# Patient Record
Sex: MALE | Race: WHITE | NOT HISPANIC OR LATINO | Employment: FULL TIME | URBAN - NONMETROPOLITAN AREA
[De-identification: names, ages, dates, MRNs, and addresses within clinical notes are randomized per-mention and may not be internally consistent; named-entity substitution may affect disease eponyms.]

---

## 2018-09-26 ENCOUNTER — HOSPITAL ENCOUNTER (EMERGENCY)
Facility: HOSPITAL | Age: 38
Discharge: HOME | End: 2018-09-26
Attending: EMERGENCY MEDICINE | Admitting: EMERGENCY MEDICINE

## 2018-09-26 VITALS
HEIGHT: 71 IN | WEIGHT: 220 LBS | DIASTOLIC BLOOD PRESSURE: 90 MMHG | OXYGEN SATURATION: 98 % | SYSTOLIC BLOOD PRESSURE: 141 MMHG | HEART RATE: 93 BPM | RESPIRATION RATE: 8 BRPM | TEMPERATURE: 98.6 F | BODY MASS INDEX: 30.8 KG/M2

## 2018-09-26 DIAGNOSIS — F10.10 ALCOHOL ABUSE: Primary | ICD-10-CM

## 2018-09-26 LAB
6-ACETYL MORPHINE: NEGATIVE
ALBUMIN SERPL-MCNC: 5 G/DL (ref 3.5–5)
ALBUMIN/GLOB SERPL: 1.8 G/DL (ref 1.5–2.5)
ALP SERPL-CCNC: 55 U/L (ref 40–129)
ALT SERPL W P-5'-P-CCNC: 68 U/L (ref 10–44)
AMPHET+METHAMPHET UR QL: NEGATIVE
ANION GAP SERPL CALCULATED.3IONS-SCNC: 7.7 MMOL/L (ref 3.6–11.2)
AST SERPL-CCNC: 51 U/L (ref 10–34)
BARBITURATES UR QL SCN: NEGATIVE
BASOPHILS # BLD AUTO: 0.01 10*3/MM3 (ref 0–0.3)
BASOPHILS NFR BLD AUTO: 0.1 % (ref 0–2)
BENZODIAZ UR QL SCN: NEGATIVE
BILIRUB SERPL-MCNC: 1.6 MG/DL (ref 0.2–1.8)
BUN BLD-MCNC: 7 MG/DL (ref 7–21)
BUN/CREAT SERPL: 8.2 (ref 7–25)
BUPRENORPHINE SERPL-MCNC: NEGATIVE NG/ML
CALCIUM SPEC-SCNC: 9.3 MG/DL (ref 7.7–10)
CANNABINOIDS SERPL QL: POSITIVE
CHLORIDE SERPL-SCNC: 103 MMOL/L (ref 99–112)
CO2 SERPL-SCNC: 26.3 MMOL/L (ref 24.3–31.9)
COCAINE UR QL: NEGATIVE
CREAT BLD-MCNC: 0.85 MG/DL (ref 0.43–1.29)
DEPRECATED RDW RBC AUTO: 45.4 FL (ref 37–54)
EOSINOPHIL # BLD AUTO: 0.22 10*3/MM3 (ref 0–0.7)
EOSINOPHIL NFR BLD AUTO: 2.9 % (ref 0–5)
ERYTHROCYTE [DISTWIDTH] IN BLOOD BY AUTOMATED COUNT: 13.1 % (ref 11.5–14.5)
ETHANOL BLD-MCNC: 40 MG/DL
ETHANOL UR QL: 0.04 %
GFR SERPL CREATININE-BSD FRML MDRD: 100 ML/MIN/1.73
GLOBULIN UR ELPH-MCNC: 2.8 GM/DL
GLUCOSE BLD-MCNC: 124 MG/DL (ref 70–110)
HCT VFR BLD AUTO: 48.4 % (ref 42–52)
HGB BLD-MCNC: 16.6 G/DL (ref 14–18)
IMM GRANULOCYTES # BLD: 0.03 10*3/MM3 (ref 0–0.03)
IMM GRANULOCYTES NFR BLD: 0.4 % (ref 0–0.5)
LYMPHOCYTES # BLD AUTO: 1.53 10*3/MM3 (ref 1–3)
LYMPHOCYTES NFR BLD AUTO: 19.9 % (ref 21–51)
MAGNESIUM SERPL-MCNC: 2 MG/DL (ref 1.7–2.6)
MCH RBC QN AUTO: 32.5 PG (ref 27–33)
MCHC RBC AUTO-ENTMCNC: 34.3 G/DL (ref 33–37)
MCV RBC AUTO: 94.7 FL (ref 80–94)
METHADONE UR QL SCN: NEGATIVE
MONOCYTES # BLD AUTO: 0.74 10*3/MM3 (ref 0.1–0.9)
MONOCYTES NFR BLD AUTO: 9.6 % (ref 0–10)
NEUTROPHILS # BLD AUTO: 5.16 10*3/MM3 (ref 1.4–6.5)
NEUTROPHILS NFR BLD AUTO: 67.1 % (ref 30–70)
OPIATES UR QL: NEGATIVE
OSMOLALITY SERPL CALC.SUM OF ELEC: 273.2 MOSM/KG (ref 273–305)
OXYCODONE UR QL SCN: NEGATIVE
PCP UR QL SCN: NEGATIVE
PLATELET # BLD AUTO: 201 10*3/MM3 (ref 130–400)
PMV BLD AUTO: 10.7 FL (ref 6–10)
POTASSIUM BLD-SCNC: 3.9 MMOL/L (ref 3.5–5.3)
PROT SERPL-MCNC: 7.8 G/DL (ref 6–8)
RBC # BLD AUTO: 5.11 10*6/MM3 (ref 4.7–6.1)
SODIUM BLD-SCNC: 137 MMOL/L (ref 135–153)
WBC NRBC COR # BLD: 7.69 10*3/MM3 (ref 4.5–12.5)

## 2018-09-26 PROCEDURE — 80307 DRUG TEST PRSMV CHEM ANLYZR: CPT | Performed by: EMERGENCY MEDICINE

## 2018-09-26 PROCEDURE — 85025 COMPLETE CBC W/AUTO DIFF WBC: CPT | Performed by: EMERGENCY MEDICINE

## 2018-09-26 PROCEDURE — 99284 EMERGENCY DEPT VISIT MOD MDM: CPT

## 2018-09-26 PROCEDURE — 83735 ASSAY OF MAGNESIUM: CPT | Performed by: EMERGENCY MEDICINE

## 2018-09-26 PROCEDURE — 80053 COMPREHEN METABOLIC PANEL: CPT | Performed by: EMERGENCY MEDICINE

## 2018-09-26 RX ORDER — SODIUM CHLORIDE 0.9 % (FLUSH) 0.9 %
1-10 SYRINGE (ML) INJECTION AS NEEDED
Status: DISCONTINUED | OUTPATIENT
Start: 2018-09-26 | End: 2018-09-26 | Stop reason: HOSPADM

## 2018-09-26 RX ORDER — LISINOPRIL 20 MG/1
20 TABLET ORAL DAILY
COMMUNITY
End: 2018-10-04

## 2018-09-26 NOTE — ED NOTES
Per JASMINE Flores PA-C, pt medically cleared for intake.  Notified, MELBA Smith Intake. Pt to be wheeled to intake area per DAYNE Benitez, Denia Junior RN  09/26/18 9555

## 2018-09-26 NOTE — ED PROVIDER NOTES
Subjective   Alcohol problem   Wants detox   Drank 2 beers pta         History provided by:  Patient   used: No    Drug / Alcohol Assessment   Primary symptoms include agitation. This is a recurrent problem. The current episode started 1 to 2 hours ago. The problem has not changed since onset.Suspected agents include alcohol. Pertinent negatives include no fever, no nausea and no vomiting. Associated medical issues include withdrawal syndrome.       Review of Systems   Constitutional: Negative for chills and fever.   HENT: Negative for congestion, ear pain and sore throat.    Respiratory: Negative for cough, shortness of breath and wheezing.    Cardiovascular: Negative for chest pain.   Gastrointestinal: Negative for diarrhea, nausea and vomiting.   Genitourinary: Negative for dysuria and flank pain.   Skin: Negative for rash.   Neurological: Negative for headaches.   Psychiatric/Behavioral: Positive for agitation. The patient is not nervous/anxious.    All other systems reviewed and are negative.      Past Medical History:   Diagnosis Date   • Diverticulitis    • Hypertension        No Known Allergies    Past Surgical History:   Procedure Laterality Date   • COLON RESECTION N/A 11/10/2016    Procedure: COLON RESECTION LAPAROSCOPIC ;  Surgeon: Miky Antoine MD;  Location: [facility] OR;  Service:    • COLONOSCOPY N/A 11/9/2016    Procedure: COLONOSCOPY;  Surgeon: Miky Antoine MD;  Location: [facility] ENDOSCOPY;  Service:    • FRACTURE SURGERY         Family History   Problem Relation Age of Onset   • Hypertension Brother    • Diabetes Maternal Grandmother        Social History     Social History   • Marital status:      Social History Main Topics   • Smoking status: Current Every Day Smoker     Packs/day: 1.50     Years: 26.00   • Smokeless tobacco: Current User     Types: Snuff   • Alcohol use 14.4 oz/week     24 Cans of beer per week      Comment: 12-14 beer daily   • Drug use: Yes      Types: Marijuana   • Sexual activity: No     Other Topics Concern   • Not on file           Objective   Physical Exam   Constitutional: He is oriented to person, place, and time. He appears well-developed and well-nourished.   HENT:   Head: Normocephalic.   Mouth/Throat: Oropharynx is clear and moist.   Neck: Neck supple.   Cardiovascular: Normal rate and regular rhythm.    Pulmonary/Chest: Effort normal and breath sounds normal.   Abdominal: Soft. Bowel sounds are normal. There is no tenderness.   Musculoskeletal: Normal range of motion.   Neurological: He is alert and oriented to person, place, and time.   Skin: Skin is warm and dry.   Psychiatric: He has a normal mood and affect. His behavior is normal. Judgment and thought content normal.   Nursing note and vitals reviewed.      Procedures           ED Course                  MDM      Final diagnoses:   Alcohol abuse            Mana Flores PA  09/26/18 1416

## 2018-10-04 ENCOUNTER — HOSPITAL ENCOUNTER (EMERGENCY)
Facility: HOSPITAL | Age: 38
Discharge: STILL ADMITTED OR ADMINISTRATIVE | End: 2018-10-04
Attending: EMERGENCY MEDICINE

## 2018-10-04 ENCOUNTER — HOSPITAL ENCOUNTER (INPATIENT)
Facility: HOSPITAL | Age: 38
LOS: 5 days | Discharge: HOME | End: 2018-10-09
Attending: PSYCHIATRY & NEUROLOGY | Admitting: PSYCHIATRY & NEUROLOGY

## 2018-10-04 VITALS
RESPIRATION RATE: 18 BRPM | HEIGHT: 71 IN | WEIGHT: 220 LBS | BODY MASS INDEX: 30.8 KG/M2 | OXYGEN SATURATION: 99 % | DIASTOLIC BLOOD PRESSURE: 78 MMHG | HEART RATE: 68 BPM | TEMPERATURE: 97.4 F | SYSTOLIC BLOOD PRESSURE: 129 MMHG

## 2018-10-04 DIAGNOSIS — F10.20 UNCOMPLICATED ALCOHOL DEPENDENCE (HCC): Primary | ICD-10-CM

## 2018-10-04 LAB
6-ACETYL MORPHINE: NEGATIVE
ALBUMIN SERPL-MCNC: 4.7 G/DL (ref 3.5–5)
ALBUMIN/GLOB SERPL: 1.8 G/DL (ref 1.5–2.5)
ALP SERPL-CCNC: 52 U/L (ref 40–129)
ALT SERPL W P-5'-P-CCNC: 74 U/L (ref 10–44)
AMPHET+METHAMPHET UR QL: NEGATIVE
ANION GAP SERPL CALCULATED.3IONS-SCNC: 6.8 MMOL/L (ref 3.6–11.2)
AST SERPL-CCNC: 47 U/L (ref 10–34)
BARBITURATES UR QL SCN: NEGATIVE
BASOPHILS # BLD AUTO: 0.02 10*3/MM3 (ref 0–0.3)
BASOPHILS NFR BLD AUTO: 0.1 % (ref 0–2)
BENZODIAZ UR QL SCN: NEGATIVE
BILIRUB SERPL-MCNC: 0.7 MG/DL (ref 0.2–1.8)
BILIRUB UR QL STRIP: NEGATIVE
BUN BLD-MCNC: 6 MG/DL (ref 7–21)
BUN/CREAT SERPL: 8.1 (ref 7–25)
BUPRENORPHINE SERPL-MCNC: NEGATIVE NG/ML
CALCIUM SPEC-SCNC: 9 MG/DL (ref 7.7–10)
CANNABINOIDS SERPL QL: POSITIVE
CHLORIDE SERPL-SCNC: 104 MMOL/L (ref 99–112)
CLARITY UR: CLEAR
CO2 SERPL-SCNC: 24.2 MMOL/L (ref 24.3–31.9)
COCAINE UR QL: NEGATIVE
COLOR UR: YELLOW
CREAT BLD-MCNC: 0.74 MG/DL (ref 0.43–1.29)
DEPRECATED RDW RBC AUTO: 45.4 FL (ref 37–54)
EOSINOPHIL # BLD AUTO: 0.33 10*3/MM3 (ref 0–0.7)
EOSINOPHIL NFR BLD AUTO: 2.1 % (ref 0–5)
ERYTHROCYTE [DISTWIDTH] IN BLOOD BY AUTOMATED COUNT: 12.9 % (ref 11.5–14.5)
ETHANOL BLD-MCNC: 140 MG/DL
ETHANOL BLD-MCNC: 94 MG/DL
ETHANOL UR QL: 0.09 %
ETHANOL UR QL: 0.14 %
GFR SERPL CREATININE-BSD FRML MDRD: 117 ML/MIN/1.73
GLOBULIN UR ELPH-MCNC: 2.6 GM/DL
GLUCOSE BLD-MCNC: 94 MG/DL (ref 70–110)
GLUCOSE UR STRIP-MCNC: NEGATIVE MG/DL
HCT VFR BLD AUTO: 46.8 % (ref 42–52)
HGB BLD-MCNC: 15.8 G/DL (ref 14–18)
HGB UR QL STRIP.AUTO: NEGATIVE
IMM GRANULOCYTES # BLD: 0.05 10*3/MM3 (ref 0–0.03)
IMM GRANULOCYTES NFR BLD: 0.3 % (ref 0–0.5)
KETONES UR QL STRIP: NEGATIVE
LEUKOCYTE ESTERASE UR QL STRIP.AUTO: NEGATIVE
LYMPHOCYTES # BLD AUTO: 1.89 10*3/MM3 (ref 1–3)
LYMPHOCYTES NFR BLD AUTO: 12.2 % (ref 21–51)
MAGNESIUM SERPL-MCNC: 2.1 MG/DL (ref 1.7–2.6)
MCH RBC QN AUTO: 32.4 PG (ref 27–33)
MCHC RBC AUTO-ENTMCNC: 33.8 G/DL (ref 33–37)
MCV RBC AUTO: 96.1 FL (ref 80–94)
METHADONE UR QL SCN: NEGATIVE
MONOCYTES # BLD AUTO: 1.59 10*3/MM3 (ref 0.1–0.9)
MONOCYTES NFR BLD AUTO: 10.2 % (ref 0–10)
NEUTROPHILS # BLD AUTO: 11.67 10*3/MM3 (ref 1.4–6.5)
NEUTROPHILS NFR BLD AUTO: 75.1 % (ref 30–70)
NITRITE UR QL STRIP: NEGATIVE
OPIATES UR QL: NEGATIVE
OSMOLALITY SERPL CALC.SUM OF ELEC: 267.5 MOSM/KG (ref 273–305)
OXYCODONE UR QL SCN: NEGATIVE
PCP UR QL SCN: NEGATIVE
PH UR STRIP.AUTO: 7 [PH] (ref 5–8)
PLATELET # BLD AUTO: 197 10*3/MM3 (ref 130–400)
PMV BLD AUTO: 10.6 FL (ref 6–10)
POTASSIUM BLD-SCNC: 4.2 MMOL/L (ref 3.5–5.3)
PROT SERPL-MCNC: 7.3 G/DL (ref 6–8)
PROT UR QL STRIP: NEGATIVE
RBC # BLD AUTO: 4.87 10*6/MM3 (ref 4.7–6.1)
SODIUM BLD-SCNC: 135 MMOL/L (ref 135–153)
SP GR UR STRIP: 1.01 (ref 1–1.03)
UROBILINOGEN UR QL STRIP: NORMAL
WBC NRBC COR # BLD: 15.55 10*3/MM3 (ref 4.5–12.5)

## 2018-10-04 PROCEDURE — 81003 URINALYSIS AUTO W/O SCOPE: CPT | Performed by: PHYSICIAN ASSISTANT

## 2018-10-04 PROCEDURE — 80307 DRUG TEST PRSMV CHEM ANLYZR: CPT | Performed by: PHYSICIAN ASSISTANT

## 2018-10-04 PROCEDURE — 93010 ELECTROCARDIOGRAM REPORT: CPT | Performed by: INTERNAL MEDICINE

## 2018-10-04 PROCEDURE — 85025 COMPLETE CBC W/AUTO DIFF WBC: CPT | Performed by: PHYSICIAN ASSISTANT

## 2018-10-04 PROCEDURE — 80053 COMPREHEN METABOLIC PANEL: CPT | Performed by: PHYSICIAN ASSISTANT

## 2018-10-04 PROCEDURE — 25010000002 THIAMINE PER 100 MG: Performed by: PHYSICIAN ASSISTANT

## 2018-10-04 PROCEDURE — 83735 ASSAY OF MAGNESIUM: CPT | Performed by: PHYSICIAN ASSISTANT

## 2018-10-04 PROCEDURE — 93005 ELECTROCARDIOGRAM TRACING: CPT | Performed by: PSYCHIATRY & NEUROLOGY

## 2018-10-04 PROCEDURE — 25010000002 MAGNESIUM SULFATE PER 500 MG OF MAGNESIUM: Performed by: PHYSICIAN ASSISTANT

## 2018-10-04 RX ORDER — FAMOTIDINE 20 MG/1
20 TABLET, FILM COATED ORAL 2 TIMES DAILY PRN
Status: DISCONTINUED | OUTPATIENT
Start: 2018-10-04 | End: 2018-10-09 | Stop reason: HOSPADM

## 2018-10-04 RX ORDER — BENZTROPINE MESYLATE 1 MG/1
1 TABLET ORAL DAILY PRN
Status: DISCONTINUED | OUTPATIENT
Start: 2018-10-04 | End: 2018-10-09 | Stop reason: HOSPADM

## 2018-10-04 RX ORDER — MULTIVITAMIN
1 TABLET ORAL DAILY
Status: DISCONTINUED | OUTPATIENT
Start: 2018-10-04 | End: 2018-10-09 | Stop reason: HOSPADM

## 2018-10-04 RX ORDER — ECHINACEA PURPUREA EXTRACT 125 MG
2 TABLET ORAL AS NEEDED
Status: DISCONTINUED | OUTPATIENT
Start: 2018-10-04 | End: 2018-10-09 | Stop reason: HOSPADM

## 2018-10-04 RX ORDER — LORAZEPAM 0.5 MG/1
0.5 TABLET ORAL
Status: COMPLETED | OUTPATIENT
Start: 2018-10-08 | End: 2018-10-08

## 2018-10-04 RX ORDER — HYDROXYZINE 50 MG/1
50 TABLET, FILM COATED ORAL EVERY 6 HOURS PRN
Status: DISCONTINUED | OUTPATIENT
Start: 2018-10-04 | End: 2018-10-09 | Stop reason: HOSPADM

## 2018-10-04 RX ORDER — THIAMINE HCL 50 MG
100 TABLET ORAL DAILY
Status: DISCONTINUED | OUTPATIENT
Start: 2018-10-04 | End: 2018-10-09 | Stop reason: HOSPADM

## 2018-10-04 RX ORDER — LORAZEPAM 2 MG/1
2 TABLET ORAL EVERY 4 HOURS PRN
Status: ACTIVE | OUTPATIENT
Start: 2018-10-05 | End: 2018-10-06

## 2018-10-04 RX ORDER — LORAZEPAM 0.5 MG/1
0.5 TABLET ORAL EVERY 4 HOURS PRN
Status: ACTIVE | OUTPATIENT
Start: 2018-10-08 | End: 2018-10-09

## 2018-10-04 RX ORDER — IBUPROFEN 600 MG/1
600 TABLET ORAL EVERY 6 HOURS PRN
Status: DISCONTINUED | OUTPATIENT
Start: 2018-10-04 | End: 2018-10-09 | Stop reason: HOSPADM

## 2018-10-04 RX ORDER — LORAZEPAM 2 MG/1
2 TABLET ORAL
Status: COMPLETED | OUTPATIENT
Start: 2018-10-05 | End: 2018-10-05

## 2018-10-04 RX ORDER — BENZONATATE 100 MG/1
100 CAPSULE ORAL 3 TIMES DAILY PRN
Status: DISCONTINUED | OUTPATIENT
Start: 2018-10-04 | End: 2018-10-09 | Stop reason: HOSPADM

## 2018-10-04 RX ORDER — LISINOPRIL 10 MG/1
10 TABLET ORAL DAILY
Status: CANCELLED | OUTPATIENT
Start: 2018-10-04

## 2018-10-04 RX ORDER — LORAZEPAM 1 MG/1
1 TABLET ORAL EVERY 4 HOURS PRN
Status: ACTIVE | OUTPATIENT
Start: 2018-10-07 | End: 2018-10-08

## 2018-10-04 RX ORDER — LORAZEPAM 2 MG/1
2 TABLET ORAL EVERY 4 HOURS PRN
Status: DISPENSED | OUTPATIENT
Start: 2018-10-04 | End: 2018-10-05

## 2018-10-04 RX ORDER — ALUMINA, MAGNESIA, AND SIMETHICONE 2400; 2400; 240 MG/30ML; MG/30ML; MG/30ML
15 SUSPENSION ORAL EVERY 6 HOURS PRN
Status: DISCONTINUED | OUTPATIENT
Start: 2018-10-04 | End: 2018-10-09 | Stop reason: HOSPADM

## 2018-10-04 RX ORDER — ONDANSETRON 4 MG/1
4 TABLET, FILM COATED ORAL EVERY 6 HOURS PRN
Status: DISCONTINUED | OUTPATIENT
Start: 2018-10-04 | End: 2018-10-09 | Stop reason: HOSPADM

## 2018-10-04 RX ORDER — LOPERAMIDE HYDROCHLORIDE 2 MG/1
2 CAPSULE ORAL 4 TIMES DAILY PRN
Status: DISCONTINUED | OUTPATIENT
Start: 2018-10-04 | End: 2018-10-09 | Stop reason: HOSPADM

## 2018-10-04 RX ORDER — LISINOPRIL 10 MG/1
10 TABLET ORAL DAILY
Status: ON HOLD | COMMUNITY
End: 2020-05-21

## 2018-10-04 RX ORDER — TRAZODONE HYDROCHLORIDE 50 MG/1
50 TABLET ORAL NIGHTLY PRN
Status: DISCONTINUED | OUTPATIENT
Start: 2018-10-04 | End: 2018-10-09 | Stop reason: HOSPADM

## 2018-10-04 RX ORDER — BENZTROPINE MESYLATE 1 MG/ML
0.5 INJECTION INTRAMUSCULAR; INTRAVENOUS DAILY PRN
Status: DISCONTINUED | OUTPATIENT
Start: 2018-10-04 | End: 2018-10-09 | Stop reason: HOSPADM

## 2018-10-04 RX ORDER — SODIUM CHLORIDE 0.9 % (FLUSH) 0.9 %
10 SYRINGE (ML) INJECTION AS NEEDED
Status: DISCONTINUED | OUTPATIENT
Start: 2018-10-04 | End: 2018-10-04 | Stop reason: HOSPADM

## 2018-10-04 RX ORDER — LISINOPRIL 10 MG/1
10 TABLET ORAL DAILY
Status: DISCONTINUED | OUTPATIENT
Start: 2018-10-05 | End: 2018-10-09 | Stop reason: HOSPADM

## 2018-10-04 RX ORDER — LORAZEPAM 1 MG/1
1 TABLET ORAL
Status: COMPLETED | OUTPATIENT
Start: 2018-10-07 | End: 2018-10-07

## 2018-10-04 RX ADMIN — LORAZEPAM 2 MG: 2 TABLET ORAL at 15:22

## 2018-10-04 RX ADMIN — TRAZODONE HYDROCHLORIDE 50 MG: 50 TABLET ORAL at 20:39

## 2018-10-04 RX ADMIN — LORAZEPAM 2 MG: 2 TABLET ORAL at 20:39

## 2018-10-04 RX ADMIN — THIAMINE HCL (VITAMIN B1) 50 MG TABLET 100 MG: at 16:24

## 2018-10-04 RX ADMIN — THIAMINE HYDROCHLORIDE 1000 ML/HR: 100 INJECTION, SOLUTION INTRAMUSCULAR; INTRAVENOUS at 11:49

## 2018-10-04 RX ADMIN — Medication 1 TABLET: at 16:25

## 2018-10-04 RX ADMIN — HYDROXYZINE HYDROCHLORIDE 50 MG: 50 TABLET, FILM COATED ORAL at 20:39

## 2018-10-04 NOTE — NURSING NOTE
Patient to intake. Safety checks initiated.  pockets emptied and search completed with two staff members present. Items logged and placed in cabinet in intake area. Pt placed in safe room for evaluation. Will continue to monitor pt status. Waiting for ED provider to clear pt medically.

## 2018-10-04 NOTE — NURSING NOTE
Called and spoke to Dr. Vigil via phone. Intake and labs discussed. Admit orders received. Rbvox2. Pt and ed provider made aware or plan of care.

## 2018-10-04 NOTE — NURSING NOTE
Patient reports that he is here to get help with his drinking problem. He reports that he has been drinking since the age of 12 and that he is tired of drinking. He and mother who is present with pt permission reports that he has spiraled out of control and is drinking a case or more of the 8 oz cans of beer. Heavy for the past year or so. Last drink late into the night into this am. Pt denies SI or HI. He also admits to smoking a little pot every day to help him rest. No psychosis reported at this time. Waiting on medical screening to be completed. .

## 2018-10-04 NOTE — PLAN OF CARE
"Problem: Patient Care Overview  Goal: Plan of Care Review  Outcome: Ongoing (interventions implemented as appropriate)   10/04/18 1818   Coping/Psychosocial   Plan of Care Reviewed With patient   Coping/Psychosocial   Patient Agreement with Plan of Care agrees   Coping/Psychosocial   Consent Given to Review Plan with Patient declines.   Plan of Care Review   Progress no change   OTHER   Outcome Summary Completed initial assessment, discussed alternative aftercare resources and expectations of treatment; reviewed treatment plan.     Goal: Individualization and Mutuality  Outcome: Ongoing (interventions implemented as appropriate)   10/04/18 1818   Personal Strengths/Vulnerabilities   Patient Personal Strengths expressive of emotions;expressive of needs;family/social support;resilient;motivated for treatment;resourceful   Patient Vulnerabilities Ineffective coping skills, alcohol abuse.   Individualization   Patient Specific Preferences Detox regime.   Patient Specific Goals (Include Timeframe) Patient to identify 3 healthy coping skills, identify 3 triggers for relapse, verbalize crisis relapse prevention plan, and complete detox regime.   Patient Specific Interventions Detox regime.   Mutuality/Individual Preferences   What Anxieties, Fears, Concerns, or Questions Do You Have About Your Care? \"I need something for anxiety.   What Information Would Help Us Give You More Personalized Care? \"I'm a quiet person and don't like crowds.\"     Goal: Discharge Needs Assessment  Outcome: Ongoing (interventions implemented as appropriate)   10/04/18 1818   Discharge Needs Assessment   Readmission Within the Last 30 Days no previous admission in last 30 days   Concerns to be Addressed coping/stress;decision making;discharge planning;substance/tobacco abuse/use;mental health;medication   Patient/Family Anticipates Transition to home with family   Patient/Family Anticipated Services at Transition outpatient care;mental health " services   Transportation Anticipated family or friend will provide   Patient's Choice of Community Agency(s) Anticipate outpatient psychiatric referral.   Current Discharge Risk psychiatric illness;substance use/abuse   Discharge Coordination/Progress Patient anticipated to return home and have outpatient psychiatric referral upon discharge. Patient has [place] [employer] insurance.   Discharge Needs Assessment,    Outpatient/Agency/Support Group Needs outpatient medication management;outpatient psychiatric care (specify);outpatient counseling   Anticipated Discharge Disposition home or self-care   DATA:           Therapist met individually with patient this date to introduce role and to discuss hospitalization expectations. Patient agreeable.        Therapist completed integrated summary, treatment plan, and initiated social history this date.     Patient contacted his mother via phone with therapist present.  Mother reports patient may return home upon discharge and appeared supportive of patient's treatment.      ASSESSMENT:       Favio is a 38 year-old ,  male [place].  He presents as voluntary admit requesting detox from alcohol.  He reports he has been drinking up to a case of beer daily with last drink the morning of 10/04/18 of 8 beers.  Patient reports he had first drink at age 12.  Patient also reports he smokes marijuana daily for his anxiety.  Patient discussed stressor of having PTSD symptoms from working as a  for decades.  He report having night terrors and flashbacks from that working experience.  Patient reports history of one suicide attempt approximately 2 years ago in which he had held a gun to his head but aborted the attempt when a former girlfriend called him.  Patient reports he has suicidal ideations in the past but denies current SI.  He reports one prior detox admission 2 years ago and that currently he is prescribed medication  for anxiety which is not helpful.  He reports his parents as support system and plans to return home with them upon discharge.  Patient denies legal issues.  He denies SI, HI, and AVH.  Patient reports moderate withdrawal symptoms.  He is oriented x3 with fair insight.  Patient displayed restricted affect and anxious mood.  He inquired about follow up with specialist for PTSD.            PLAN:       Treatment team will focus efforts on stabilizing patient's acute symptoms while providing education on healthy coping and crisis management to reduce hospitalizations. Patient requires daily psychiatrist evaluation and 24/7 nursing supervision to promote patient safety.      Therapist will offer 1-4 individual sessions (20-30 minutes each), 1 therapy group daily, family education, and appropriate referral.      Therapist recommends residential or intensive outpatient treatment at this time; Patient reports he plans to return to work as a  and will follow up on outpatient basis with someone experienced in PTSD.

## 2018-10-04 NOTE — ED PROVIDER NOTES
Subjective   38-year-old male presents to the ED today for a detox evaluation.  He states he needs detox from alcohol.  He states he drinks approximately 14 or 16 beers a day.  His last drink was around 8 AM today.  He denies any current withdrawal symptoms.  He states he does occasionally use marijuana.  He denies any suicidal ideations today.        History provided by:  Patient  Drug / Alcohol Assessment   Primary symptoms comment: requesting detox. This is a new problem. The current episode started 3 to 5 hours ago. The problem has not changed since onset.Suspected agents include alcohol. Pertinent negatives include no fever, no injury, no nausea, no vomiting, no bladder incontinence and no bowel incontinence. Associated medical issues include addiction treatment.       Review of Systems   Constitutional: Negative for fever.   HENT: Negative.    Eyes: Negative.    Respiratory: Negative.    Cardiovascular: Negative.    Gastrointestinal: Negative for bowel incontinence, nausea and vomiting.   Genitourinary: Negative.  Negative for bladder incontinence.   Musculoskeletal: Negative.    Skin: Negative.    Neurological: Negative.    Psychiatric/Behavioral: Negative for suicidal ideas.   All other systems reviewed and are negative.      Past Medical History:   Diagnosis Date   • Diverticulitis    • HTN (hypertension)    • Hypertension        No Known Allergies    Past Surgical History:   Procedure Laterality Date   • COLON RESECTION N/A 11/10/2016    Procedure: COLON RESECTION LAPAROSCOPIC ;  Surgeon: Miky Antoine MD;  Location: [facility] OR;  Service:    • COLONOSCOPY N/A 11/9/2016    Procedure: COLONOSCOPY;  Surgeon: Miky Antoine MD;  Location: [facility] ENDOSCOPY;  Service:    • FRACTURE SURGERY         Family History   Problem Relation Age of Onset   • Hypertension Brother    • Diabetes Maternal Grandmother        Social History     Social History   • Marital status:      Social History Main Topics    • Smoking status: Current Every Day Smoker     Packs/day: 1.50     Years: 26.00     Types: Cigarettes   • Smokeless tobacco: Current User     Types: Snuff   • Alcohol use 14.4 oz/week     24 Cans of beer per week      Comment: 12-14 beer daily or more   • Drug use: Yes     Types: Marijuana, Other      Comment: etoh   • Sexual activity: No     Other Topics Concern   • Not on file           Objective   Physical Exam   Constitutional: He is oriented to person, place, and time. He appears well-developed and well-nourished. No distress.   HENT:   Head: Normocephalic and atraumatic.   Right Ear: External ear normal.   Left Ear: External ear normal.   Nose: Nose normal.   Mouth/Throat: Oropharynx is clear and moist.   Eyes: Pupils are equal, round, and reactive to light. Conjunctivae and EOM are normal.   Neck: Normal range of motion. Neck supple.   Cardiovascular: Normal rate, regular rhythm, normal heart sounds and intact distal pulses.    Pulmonary/Chest: Effort normal and breath sounds normal.   Abdominal: Soft. Bowel sounds are normal. There is no tenderness.   Musculoskeletal: Normal range of motion.   Neurological: He is alert and oriented to person, place, and time.   Skin: Skin is warm and dry. Capillary refill takes less than 2 seconds.   Psychiatric: He has a normal mood and affect. His behavior is normal. Judgment and thought content normal.   Nursing note and vitals reviewed.      Procedures           ED Course  ED Course as of Oct 04 1438   Thu Oct 04, 2018   1343 Medically clear for detox  [AH]      ED Course User Index  [AH] MariaG uadalupe Castro PA                  [facility]  Number of Diagnoses or Management Options  Uncomplicated alcohol dependence (CMS/HCC):      Amount and/or Complexity of Data Reviewed  Clinical lab tests: reviewed    Patient Progress  Patient progress: stable        Final diagnoses:   Uncomplicated alcohol dependence (CMS/HCC)            Maria Guadalupe Castro PA  10/04/18 1436

## 2018-10-05 PROBLEM — F10.239 ALCOHOL DEPENDENCE WITH WITHDRAWAL: Status: ACTIVE | Noted: 2018-10-04

## 2018-10-05 PROBLEM — I10 HTN (HYPERTENSION): Status: ACTIVE | Noted: 2018-10-05

## 2018-10-05 PROCEDURE — 99222 1ST HOSP IP/OBS MODERATE 55: CPT | Performed by: PSYCHIATRY & NEUROLOGY

## 2018-10-05 RX ADMIN — HYDROXYZINE HYDROCHLORIDE 50 MG: 50 TABLET, FILM COATED ORAL at 21:18

## 2018-10-05 RX ADMIN — LORAZEPAM 2 MG: 2 TABLET ORAL at 08:17

## 2018-10-05 RX ADMIN — Medication 1 TABLET: at 08:17

## 2018-10-05 RX ADMIN — TRAZODONE HYDROCHLORIDE 50 MG: 50 TABLET ORAL at 21:18

## 2018-10-05 RX ADMIN — LORAZEPAM 2 MG: 2 TABLET ORAL at 14:46

## 2018-10-05 RX ADMIN — LISINOPRIL 10 MG: 10 TABLET ORAL at 08:17

## 2018-10-05 RX ADMIN — LORAZEPAM 2 MG: 2 TABLET ORAL at 21:18

## 2018-10-05 RX ADMIN — THIAMINE HCL (VITAMIN B1) 50 MG TABLET 100 MG: at 08:17

## 2018-10-05 RX ADMIN — HYDROXYZINE HYDROCHLORIDE 50 MG: 50 TABLET, FILM COATED ORAL at 14:48

## 2018-10-05 NOTE — PLAN OF CARE
Problem: Patient Care Overview  Goal: Plan of Care Review  Outcome: Ongoing (interventions implemented as appropriate)   10/05/18 0606   Coping/Psychosocial   Plan of Care Reviewed With patient   Coping/Psychosocial   Patient Agreement with Plan of Care agrees   Plan of Care Review   Progress improving       Problem: Overarching Goals (Adult)  Goal: Adheres to Safety Considerations for Self and Others  Outcome: Ongoing (interventions implemented as appropriate)    Goal: Optimized Coping Skills in Response to Life Stressors  Outcome: Ongoing (interventions implemented as appropriate)    Goal: Develops/Participates in Therapeutic Tracy to Support Successful Transition  Outcome: Ongoing (interventions implemented as appropriate)
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
## 2018-10-05 NOTE — PROGRESS NOTES
Therapist met with patient to address concerns and discuss progress with treatment.  Therapist reviewed that [facility] is located in patient's hometown of [place] and reviewed their available services.  Patient receptive for follow up to be scheduled.  He reports moderate withdrawal symptoms.  Patient reported feeling anxious.  He denied SI, HI, and AVH.  Patient oriented x3.  He appeared calm and cooperative.  Patient continues detox regime.

## 2018-10-05 NOTE — PROGRESS NOTES
Staffed case with patient's primary therapist. She asked me to research agencies in the [place] area. They have [facility] mental health services available in [place]. Therapist will ask patient about consenting for treatment.

## 2018-10-06 PROCEDURE — 99232 SBSQ HOSP IP/OBS MODERATE 35: CPT | Performed by: PSYCHIATRY & NEUROLOGY

## 2018-10-06 RX ADMIN — TRAZODONE HYDROCHLORIDE 50 MG: 50 TABLET ORAL at 21:03

## 2018-10-06 RX ADMIN — LORAZEPAM 1.5 MG: 0.5 TABLET ORAL at 21:03

## 2018-10-06 RX ADMIN — HYDROXYZINE HYDROCHLORIDE 50 MG: 50 TABLET, FILM COATED ORAL at 14:26

## 2018-10-06 RX ADMIN — Medication 1 TABLET: at 08:34

## 2018-10-06 RX ADMIN — THIAMINE HCL (VITAMIN B1) 50 MG TABLET 100 MG: at 08:34

## 2018-10-06 RX ADMIN — LORAZEPAM 1.5 MG: 0.5 TABLET ORAL at 14:26

## 2018-10-06 RX ADMIN — LORAZEPAM 1.5 MG: 0.5 TABLET ORAL at 08:34

## 2018-10-06 RX ADMIN — HYDROXYZINE HYDROCHLORIDE 50 MG: 50 TABLET, FILM COATED ORAL at 19:31

## 2018-10-06 RX ADMIN — HYDROXYZINE HYDROCHLORIDE 50 MG: 50 TABLET, FILM COATED ORAL at 08:34

## 2018-10-06 RX ADMIN — LISINOPRIL 10 MG: 10 TABLET ORAL at 08:34

## 2018-10-06 NOTE — PLAN OF CARE
Problem: Patient Care Overview  Goal: Plan of Care Review  Outcome: Ongoing (interventions implemented as appropriate)   10/06/18 0032   Coping/Psychosocial   Plan of Care Reviewed With patient   Coping/Psychosocial   Patient Agreement with Plan of Care agrees   Plan of Care Review   Progress improving       Problem: Overarching Goals (Adult)  Goal: Adheres to Safety Considerations for Self and Others  Outcome: Ongoing (interventions implemented as appropriate)    Goal: Optimized Coping Skills in Response to Life Stressors  Outcome: Ongoing (interventions implemented as appropriate)    Goal: Develops/Participates in Therapeutic [place] to Support Successful Transition  Outcome: Ongoing (interventions implemented as appropriate)

## 2018-10-06 NOTE — PROGRESS NOTES
"    Detox Recovery Unit Progress Note   Clinician: Odilon Wilkins MD  Admission Date: 10/4/2018  2:16 PM 10/06/18    Behavioral Health Treatment Plan and Problem List: I have reviewed and approved the Behavioral Health Treatment Plan and Problem list.    Allergies  No Known Allergies    Hospital Day: 2 days      Assessment completed within view of staff    History  CC: Detox Recovery Unit followup note  Interval HPI: Patient seen and evaluated by me.  Chart reviewed. Patient is withdrawing from alcohol.  Current withdrawal symptoms include: anxiety, tremor, sweats.  Sensorium intact.  Mild perceptual disturbances.  ROS otherwise as below.      Interval Review of Systems:   General ROS: negative for - fever.  Positive for withdrawal symptoms mentioned above.  Endocrine ROS: negative for - palpitations  Respiratory ROS: no cough, shortness of breath, or wheezing  Cardiovascular ROS: no chest pain or dyspnea on exertion  Gastrointestinal ROS: no abdominal pain,no black or bloody stools    /92 (BP Location: Right arm, Patient Position: Sitting)   Pulse 98   Temp 97.7 °F (36.5 °C) (Temporal Artery )   Resp 18   Ht 180.3 cm (71\")   Wt 97.7 kg (215 lb 6.4 oz)   SpO2 99%   BMI 30.04 kg/m²     Mental Status Exam  Mood: anxious  Affect: dysphoric   Thought Processes: linear, logical, and goal directed  Thought Content:  sensorium intact and +perceptual disturbances  Oriented X3:  yes  Suicidal Thoughts: denies        Medical Decision Making:   Labs:     Lab Results (last 24 hours)     ** No results found for the last 24 hours. **            Radiology:     Imaging Results (last 24 hours)     ** No results found for the last 24 hours. **            EKG:     ECG/EMG Results (most recent)     Procedure Component Value Units Date/Time    ECG 12 Lead [[id number]] Collected:  10/04/18 1726     Updated:  10/04/18 1818    Narrative:       Test Reason : Potential adverse reaction to medications.  Blood Pressure : **/** " mmHG  Vent. Rate : 075 BPM     Atrial Rate : 075 BPM     P-R Int : 172 ms          QRS Dur : 100 ms      QT Int : 380 ms       P-R-T Axes : 067 073 063 degrees     QTc Int : 424 ms    Normal sinus rhythm  Normal ECG  No previous ECGs available  Confirmed by Phu López (2020) on 10/4/2018 6:18:32 PM    Referred By:  AJAY           Confirmed By:Phu López           Medications:     lisinopril 10 mg Oral Daily   LORazepam 1.5 mg Oral 3 times per day   Followed by      [START ON 10/7/2018] LORazepam 1 mg Oral 3 times per day   Followed by      [START ON 10/8/2018] LORazepam 0.5 mg Oral 3 times per day   multivitamin 1 tablet Oral Daily   vitamin B-1 100 mg Oral Daily          All medications reviewed.      Assessment and Plan:   Active Problems:    Alcohol dependence with withdrawal (CMS/HCC)  - Continue Ativan taper.  - Continue multivitamin and thiamine      HTN (hypertension)  - Continue lisinopril        Continue hospitalization for medical management of drug withdrawal and patient safety and stabilization.  Continue individual and group chemical dependency counseling.   Therapist and treatment team will continue to assist patient in establishing plans for follow-up and rehabilitation that will serve as the next step in care once patient has completed the medical detox.

## 2018-10-07 PROCEDURE — 99232 SBSQ HOSP IP/OBS MODERATE 35: CPT | Performed by: PSYCHIATRY & NEUROLOGY

## 2018-10-07 RX ADMIN — THIAMINE HCL (VITAMIN B1) 50 MG TABLET 100 MG: at 08:29

## 2018-10-07 RX ADMIN — TRAZODONE HYDROCHLORIDE 50 MG: 50 TABLET ORAL at 21:09

## 2018-10-07 RX ADMIN — HYDROXYZINE HYDROCHLORIDE 50 MG: 50 TABLET, FILM COATED ORAL at 12:16

## 2018-10-07 RX ADMIN — LORAZEPAM 1 MG: 1 TABLET ORAL at 14:21

## 2018-10-07 RX ADMIN — LORAZEPAM 1 MG: 1 TABLET ORAL at 08:29

## 2018-10-07 RX ADMIN — Medication 1 TABLET: at 08:30

## 2018-10-07 RX ADMIN — LORAZEPAM 1 MG: 1 TABLET ORAL at 21:09

## 2018-10-07 RX ADMIN — HYDROXYZINE HYDROCHLORIDE 50 MG: 50 TABLET, FILM COATED ORAL at 21:09

## 2018-10-07 RX ADMIN — LISINOPRIL 10 MG: 10 TABLET ORAL at 08:30

## 2018-10-07 NOTE — PROGRESS NOTES
"    Detox Recovery Unit Progress Note   Clinician: Odilon Wilkins MD  Admission Date: 10/4/2018  8:15 AM 10/07/18    Behavioral Health Treatment Plan and Problem List: I have reviewed and approved the Behavioral Health Treatment Plan and Problem list.    Allergies  No Known Allergies    Hospital Day: 3 days      Assessment completed within view of staff    History  CC: Detox Recovery Unit followup note  Interval HPI: Patient seen and evaluated by me.  Chart reviewed. Patient is withdrawing from alcohol.  Current withdrawal symptoms include: anxiety. Slept okay last night.   ROS otherwise as below.      Interval Review of Systems:   General ROS: negative for - fever.  Positive for withdrawal symptoms mentioned above.  Endocrine ROS: negative for - palpitations  Respiratory ROS: no cough, shortness of breath, or wheezing  Cardiovascular ROS: no chest pain or dyspnea on exertion  Gastrointestinal ROS: no abdominal pain,no black or bloody stools    /78 (BP Location: Right arm, Patient Position: Lying)   Pulse 56   Temp 97.5 °F (36.4 °C) (Temporal Artery )   Resp 18   Ht 180.3 cm (71\")   Wt 97.7 kg (215 lb 6.4 oz)   SpO2 97%   BMI 30.04 kg/m²     Mental Status Exam  Mood: normal  Affect: normal   Thought Processes: linear, logical, and goal directed  Thought Content:  sensorium intact  Oriented X3:  yes  Suicidal Thoughts: none        Medical Decision Making:   Labs:     Lab Results (last 24 hours)     ** No results found for the last 24 hours. **            Radiology:     Imaging Results (last 24 hours)     ** No results found for the last 24 hours. **            EKG:     ECG/EMG Results (most recent)     Procedure Component Value Units Date/Time    ECG 12 Lead [[id number]] Collected:  10/04/18 1726     Updated:  10/04/18 1818    Narrative:       Test Reason : Potential adverse reaction to medications.  Blood Pressure : **/** mmHG  Vent. Rate : 075 BPM     Atrial Rate : 075 BPM     P-R Int : 172 ms          " QRS Dur : 100 ms      QT Int : 380 ms       P-R-T Axes : 067 073 063 degrees     QTc Int : 424 ms    Normal sinus rhythm  Normal ECG  No previous ECGs available  Confirmed by Phu López (2020) on 10/4/2018 6:18:32 PM    Referred By:  AJAY           Confirmed By:Phu López           Medications:     lisinopril 10 mg Oral Daily   LORazepam 1 mg Oral 3 times per day   Followed by      [START ON 10/8/2018] LORazepam 0.5 mg Oral 3 times per day   multivitamin 1 tablet Oral Daily   vitamin B-1 100 mg Oral Daily          All medications reviewed.      Assessment and Plan:   Active Problems:    Alcohol dependence with withdrawal (CMS/HCC)  - Continue Ativan detox protoco'  - Continue multivitamin and thiamine daily      HTN (hypertension)  - Continue lisinopril        Continue hospitalization for medical management of drug withdrawal and patient safety and stabilization.  Continue individual and group chemical dependency counseling.   Therapist and treatment team will continue to assist patient in establishing plans for follow-up and rehabilitation that will serve as the next step in care once patient has completed the medical detox.

## 2018-10-07 NOTE — PLAN OF CARE
Problem: Overarching Goals (Adult)  Goal: Optimized Coping Skills in Response to Life Stressors  Outcome: Ongoing (interventions implemented as appropriate)    Goal: Develops/Participates in Therapeutic [place] to Support Successful Transition  Outcome: Ongoing (interventions implemented as appropriate)

## 2018-10-07 NOTE — PLAN OF CARE
Problem: Patient Care Overview  Goal: Plan of Care Review  Outcome: Ongoing (interventions implemented as appropriate)   10/07/18 0701   Coping/Psychosocial   Plan of Care Reviewed With patient   Coping/Psychosocial   Patient Agreement with Plan of Care agrees   Plan of Care Review   Progress improving       Problem: Overarching Goals (Adult)  Goal: Adheres to Safety Considerations for Self and Others  Outcome: Ongoing (interventions implemented as appropriate)    Goal: Optimized Coping Skills in Response to Life Stressors  Outcome: Ongoing (interventions implemented as appropriate)    Goal: Develops/Participates in Therapeutic [place] to Support Successful Transition  Outcome: Ongoing (interventions implemented as appropriate)

## 2018-10-07 NOTE — PLAN OF CARE
Problem: Patient Care Overview  Goal: Plan of Care Review  Outcome: Ongoing (interventions implemented as appropriate)   10/06/18 2022   Coping/Psychosocial   Plan of Care Reviewed With patient   Coping/Psychosocial   Patient Agreement with Plan of Care agrees   Plan of Care Review   Progress no change   OTHER   Outcome Summary Will observe client for changes and will follow up as needed.        Problem: Overarching Goals (Adult)  Goal: Adheres to Safety Considerations for Self and Others  Outcome: Ongoing (interventions implemented as appropriate)   10/06/18 2022   Overarching Goals (Adult)   Adheres to Safety Considerations for Self and Others making progress toward outcome     Goal: Optimized Coping Skills in Response to Life Stressors  Outcome: Ongoing (interventions implemented as appropriate)   10/06/18 2022   Overarching Goals (Adult)   Optimized Coping Skills in Response to Life Stressors making progress toward outcome     Goal: Develops/Participates in Therapeutic [place] to Support Successful Transition  Outcome: Ongoing (interventions implemented as appropriate)   10/06/18 2022   Overarching Goals (Adult)   Develops/Participates in Therapeutic [place] to Support Successful Transition making progress toward outcome

## 2018-10-08 PROCEDURE — 99232 SBSQ HOSP IP/OBS MODERATE 35: CPT | Performed by: PSYCHIATRY & NEUROLOGY

## 2018-10-08 RX ADMIN — THIAMINE HCL (VITAMIN B1) 50 MG TABLET 100 MG: at 08:15

## 2018-10-08 RX ADMIN — LORAZEPAM 0.5 MG: 0.5 TABLET ORAL at 21:15

## 2018-10-08 RX ADMIN — TRAZODONE HYDROCHLORIDE 50 MG: 50 TABLET ORAL at 21:15

## 2018-10-08 RX ADMIN — LORAZEPAM 0.5 MG: 0.5 TABLET ORAL at 08:15

## 2018-10-08 RX ADMIN — LORAZEPAM 0.5 MG: 0.5 TABLET ORAL at 14:20

## 2018-10-08 RX ADMIN — HYDROXYZINE HYDROCHLORIDE 50 MG: 50 TABLET, FILM COATED ORAL at 21:15

## 2018-10-08 RX ADMIN — Medication 1 TABLET: at 08:15

## 2018-10-08 RX ADMIN — LISINOPRIL 10 MG: 10 TABLET ORAL at 08:15

## 2018-10-08 RX ADMIN — IBUPROFEN 600 MG: 600 TABLET ORAL at 13:34

## 2018-10-08 RX ADMIN — HYDROXYZINE HYDROCHLORIDE 50 MG: 50 TABLET, FILM COATED ORAL at 08:16

## 2018-10-08 RX ADMIN — HYDROXYZINE HYDROCHLORIDE 50 MG: 50 TABLET, FILM COATED ORAL at 14:20

## 2018-10-08 NOTE — PLAN OF CARE
Problem: Patient Care Overview  Goal: Interprofessional Rounds/Family Conf  Outcome: Ongoing (interventions implemented as appropriate)   10/08/18 1256   Interdisciplinary Rounds/Family Conf   Summary Treatment team staffing    Interdisciplinary Rounds/Family Conf   Participants patient;social work;psychiatrist;nursing       1257:     Therapist staffed case with Dr. Gregory. Covering therapist checking on patient's needs. Patient to complete detox today and anticipate discharge tomorrow.  Patient denies needs.  Patient reports that he plans to return home and back to work.  He has declined residential referral and is scheduled with [facility].  Therapist will continue to assist as needed.

## 2018-10-08 NOTE — PROGRESS NOTES
"INPATIENT PSYCHIATRIC PROGRESS NOTE    Name:  Favio Perez  :  1980  MRN:  [id number]  Visit Number:  [id number]  Length of stay:  4    SUBJECTIVE  CC/Focus of Exam: f/u alcohol use    INTERVAL HISTORY:  The patient states that he is feeling better. He is on his last day of detox and states that it is helping.  Depression rating 0/10  Anxiety rating 7/10  Sleep: good  Withdrawal sx: denies  Cravin/10    Review of Systems   Respiratory: Negative.    Cardiovascular: Negative.        OBJECTIVE    Temp:  [97.1 °F (36.2 °C)-98 °F (36.7 °C)] 97.1 °F (36.2 °C)  Heart Rate:  [68-78] 73  Resp:  [16-18] 18  BP: (108-136)/(82-93) 132/93    MENTAL STATUS EXAM:  Appearance:Casually dressed, good hygeine.   Cooperation:Cooperative  Psychomotor: No psychomotor agitation/retardation, No EPS, No motor tics  Speech-normal rate, amount.  Mood \"good\"   Affect-congruent, appropriate, stable  Thought Content-goal directed, no delusional material present  Thought process-linear, organized.  Suicidality: No SI  Homicidality: No HI  Perception: No AH/VH  Insight-fair   Judgement-fair    Lab Results (last 24 hours)     ** No results found for the last 24 hours. **             Imaging Results (last 24 hours)     ** No results found for the last 24 hours. **             ECG/EMG Results (most recent)     Procedure Component Value Units Date/Time    ECG 12 Lead [[id number]] Collected:  10/04/18 1726     Updated:  10/04/18 1818    Narrative:       Test Reason : Potential adverse reaction to medications.  Blood Pressure : **/** mmHG  Vent. Rate : 075 BPM     Atrial Rate : 075 BPM     P-R Int : 172 ms          QRS Dur : 100 ms      QT Int : 380 ms       P-R-T Axes : 067 073 063 degrees     QTc Int : 424 ms    Normal sinus rhythm  Normal ECG  No previous ECGs available  Confirmed by Phu López () on 10/4/2018 6:18:32 PM    Referred By:  AJAY           Confirmed By:Phu López           ALLERGIES: Patient has no " known allergies.      Current Facility-Administered Medications:   •  aluminum-magnesium hydroxide-simethicone (MAALOX MAX) 400-400-40 MG/5ML suspension 15 mL, 15 mL, Oral, Q6H PRN, Sami Diaz MD  •  benzonatate (TESSALON) capsule 100 mg, 100 mg, Oral, TID PRN, Sami Diaz MD  •  benztropine (COGENTIN) tablet 1 mg, 1 mg, Oral, Daily PRN **OR** benztropine (COGENTIN) injection 0.5 mg, 0.5 mg, Intramuscular, Daily PRN, Sami Diaz MD  •  famotidine (PEPCID) tablet 20 mg, 20 mg, Oral, BID PRN, Sami Diaz MD  •  hydrOXYzine (ATARAX) tablet 50 mg, 50 mg, Oral, Q6H PRN, Sami Diaz MD, 50 mg at 10/08/18 0816  •  ibuprofen (ADVIL,MOTRIN) tablet 600 mg, 600 mg, Oral, Q6H PRN, Sami Diaz MD  •  lisinopril (PRINIVIL,ZESTRIL) tablet 10 mg, 10 mg, Oral, Daily, Sami Diaz MD, 10 mg at 10/08/18 0815  •  loperamide (IMODIUM) capsule 2 mg, 2 mg, Oral, 4x Daily PRN, Sami Diaz MD  •  [COMPLETED] LORazepam (ATIVAN) tablet 2 mg, 2 mg, Oral, 3 times per day, 2 mg at 10/05/18 2118 **FOLLOWED BY** [COMPLETED] LORazepam (ATIVAN) tablet 1.5 mg, 1.5 mg, Oral, 3 times per day, 1.5 mg at 10/06/18 2103 **FOLLOWED BY** [COMPLETED] LORazepam (ATIVAN) tablet 1 mg, 1 mg, Oral, 3 times per day, 1 mg at 10/07/18 2109 **FOLLOWED BY** LORazepam (ATIVAN) tablet 0.5 mg, 0.5 mg, Oral, 3 times per day, Sami Diaz MD, 0.5 mg at 10/08/18 0815  •  [] LORazepam (ATIVAN) tablet 2 mg, 2 mg, Oral, Q4H PRN **FOLLOWED BY** [] LORazepam (ATIVAN) tablet 1.5 mg, 1.5 mg, Oral, Q4H PRN **FOLLOWED BY** [] LORazepam (ATIVAN) tablet 1 mg, 1 mg, Oral, Q4H PRN **FOLLOWED BY** LORazepam (ATIVAN) tablet 0.5 mg, 0.5 mg, Oral, Q4H PRN, Sami Diaz MD  •  magnesium hydroxide (MILK OF MAGNESIA) suspension 2400 mg/10mL 10 mL, 10 mL, Oral, Daily PRN, Sami Diaz MD  •  multivitamin (DAILY GIDEON) tablet 1 tablet, 1 tablet, Oral, Daily, Sami Diaz MD, 1 tablet at 10/08/18 0815  •  ondansetron (ZOFRAN) tablet 4  mg, 4 mg, Oral, Q6H PRN, Sami Diaz MD  •  sodium chloride (OCEAN) nasal spray 2 spray, 2 spray, Each Nare, PRN, Sami Diaz MD  •  traZODone (DESYREL) tablet 50 mg, 50 mg, Oral, Nightly PRN, Sami Diaz MD, 50 mg at 10/07/18 2109  •  vitamin B-1 tablet 100 mg, 100 mg, Oral, Daily, Sami Diaz MD, 100 mg at 10/08/18 0815    ASSESSMENT & PLAN:    Active Problems:    Alcohol dependence with withdrawal (CMS/HCC)  Plan: Continue Ativan detox      HTN (hypertension)  Plan: Lisinopril    Plan: Discharge home tomorrow.    Suicide precautions: None    Behavioral Health Treatment Plan and Problem List: I have reviewed and approved the Behavioral Health Treatment Plan and Problem list.  The patient has had a chance to review and agrees with the treatment plan.     Clinician:  Cecelia Gregory MD  10/08/18  10:27 AM

## 2018-10-08 NOTE — PLAN OF CARE
Problem: Patient Care Overview  Goal: Plan of Care Review  Outcome: Ongoing (interventions implemented as appropriate)   10/08/18 0722   Coping/Psychosocial   Plan of Care Reviewed With patient   Coping/Psychosocial   Patient Agreement with Plan of Care agrees   Plan of Care Review   Progress improving   OTHER   Outcome Summary Pt. stable and slept all night. Reports anxiety 8. depression 2. Craving 0. Denies WD symptoms. Denies SI and HI. Denies hallucinations. Cooperative. Did attend Group.

## 2018-10-09 VITALS
BODY MASS INDEX: 30.15 KG/M2 | DIASTOLIC BLOOD PRESSURE: 88 MMHG | WEIGHT: 215.4 LBS | OXYGEN SATURATION: 98 % | HEIGHT: 71 IN | RESPIRATION RATE: 18 BRPM | SYSTOLIC BLOOD PRESSURE: 146 MMHG | HEART RATE: 59 BPM | TEMPERATURE: 96.6 F

## 2018-10-09 PROCEDURE — 99238 HOSP IP/OBS DSCHRG MGMT 30/<: CPT | Performed by: PSYCHIATRY & NEUROLOGY

## 2018-10-09 RX ORDER — HYDROXYZINE HYDROCHLORIDE 25 MG/1
25 TABLET, FILM COATED ORAL EVERY 8 HOURS PRN
Qty: 90 TABLET | Refills: 0 | Status: ON HOLD | OUTPATIENT
Start: 2018-10-09 | End: 2020-05-21

## 2018-10-09 RX ADMIN — THIAMINE HCL (VITAMIN B1) 50 MG TABLET 100 MG: at 08:50

## 2018-10-09 RX ADMIN — Medication 1 TABLET: at 08:50

## 2018-10-09 RX ADMIN — LISINOPRIL 10 MG: 10 TABLET ORAL at 08:50

## 2018-10-09 RX ADMIN — HYDROXYZINE HYDROCHLORIDE 50 MG: 50 TABLET, FILM COATED ORAL at 09:43

## 2018-10-09 NOTE — PLAN OF CARE
Problem: Patient Care Overview  Goal: Plan of Care Review  Outcome: Ongoing (interventions implemented as appropriate)   10/09/18 0454   Coping/Psychosocial   Plan of Care Reviewed With patient   Coping/Psychosocial   Patient Agreement with Plan of Care agrees   Plan of Care Review   Progress improving   OTHER   Outcome Summary Pt calm and cooperative. Pt denies craving       Problem: Overarching Goals (Adult)  Goal: Adheres to Safety Considerations for Self and Others  Outcome: Ongoing (interventions implemented as appropriate)    Goal: Optimized Coping Skills in Response to Life Stressors  Outcome: Ongoing (interventions implemented as appropriate)    Goal: Develops/Participates in Therapeutic [place] to Support Successful Transition  Outcome: Ongoing (interventions implemented as appropriate)      Problem: Alcohol Withdrawal Acute, Risk/Actual (Adult)  Goal: Signs and Symptoms of Listed Potential Problems Will be Absent, Minimized or Managed (Alcohol Withdrawal Acute, Risk/Actual)  Outcome: Ongoing (interventions implemented as appropriate)      Problem: Anxiety (Adult)  Goal: Identify Related Risk Factors and Signs and Symptoms  Outcome: Ongoing (interventions implemented as appropriate)

## 2018-10-09 NOTE — DISCHARGE SUMMARY
"      PSYCHIATRIC DISCHARGE SUMMARY     Patient Identification:  Name:  Favio Perez  Age:  38 y.o.  Sex:  male  :  1980  MRN:  [id number]  Visit Number:  [id number]      Date of Admission:10/4/2018   Date of Discharge:  10/9/2018    Discharge Diagnosis:  Active Problems:    Alcohol dependence with withdrawal (CMS/HCC)    HTN (hypertension)        Admission Diagnosis:  EtOH dependence (CMS/HCC) [F10.20]     Hospital Course  Patient is a 38 y.o. male presented with alcohol use and withdrawals. The patient was admitted to the [facility] detox recovery unit for safety, further evaluation and treatment.  He was treated with Ativan detox and was able to complete it without any complications. He was continued on Lisinopril for hypertension.  The patient was also able to take part in individual and group counseling sessions and work on appropriate coping skills.  The patient made steady improvement in his mood and expressed feeling more positive and hopeful about future. Sleep and appetite were improved.  The day of discharge the patient was calm, cooperative and pleasant. Mood was reported to be good, and denied SI/HI/AVH. Also reported no medication side effects.        Mental Status Exam upon discharge:   Mood \"good\"   Affect-congruent, appropriate, stable  Thought Content-goal directed, no delusional material present  Thought process-linear, organized.  Suicidality: No SI  Homicidality: No HI  Perception: No AH/VH    Procedures Performed         Consults:   Consults     No orders found from 2018 to 10/5/2018.          Pertinent Test Results: ALT 74, AST 47, WBC 15.55, Neutrophils 75.1, Blood alcohol level at admission 140, UDS positive for THC.    Condition on Discharge:  improved    Vital Signs  Temp:  [96.6 °F (35.9 °C)-97.9 °F (36.6 °C)] 96.6 °F (35.9 °C)  Heart Rate:  [53-79] 59  Resp:  [16-18] 18  BP: ()/(57-98) 146/88      Discharge Disposition:  Home or Self Care    Discharge " Medications:     Discharge Medications      New Medications      Instructions Start Date   hydrOXYzine 25 MG tablet  Commonly known as:  ATARAX   25 mg, Oral, Every 8 Hours PRN         Continue These Medications      Instructions Start Date   lisinopril 10 MG tablet  Commonly known as:  PRINIVIL,ZESTRIL   10 mg, Oral, Daily             Discharge Diet: Regular    Activity at Discharge: As tolerated    Follow-up Appointments      [address] [address]  [phone]  Appt: October 17th @ 9:00am with Janeen         Test Results Pending at Discharge      Clinician:   Cecelia Gregory MD  10/09/18  12:20 PM

## 2020-05-21 ENCOUNTER — HOSPITAL ENCOUNTER (INPATIENT)
Facility: HOSPITAL | Age: 40
LOS: 3 days | Discharge: HOME | End: 2020-05-24
Attending: PSYCHIATRY & NEUROLOGY | Admitting: PSYCHIATRY & NEUROLOGY

## 2020-05-21 ENCOUNTER — HOSPITAL ENCOUNTER (EMERGENCY)
Facility: HOSPITAL | Age: 40
Discharge: PSYCHIATRIC OR SUBSTANCE USE FACILITY | End: 2020-05-21
Attending: EMERGENCY MEDICINE | Admitting: EMERGENCY MEDICINE

## 2020-05-21 VITALS
TEMPERATURE: 98.2 F | SYSTOLIC BLOOD PRESSURE: 142 MMHG | DIASTOLIC BLOOD PRESSURE: 82 MMHG | OXYGEN SATURATION: 97 % | HEART RATE: 72 BPM | WEIGHT: 230 LBS | BODY MASS INDEX: 32.2 KG/M2 | HEIGHT: 71 IN | RESPIRATION RATE: 18 BRPM

## 2020-05-21 DIAGNOSIS — F10.10 ALCOHOL ABUSE: Primary | ICD-10-CM

## 2020-05-21 PROBLEM — F10.20 ALCOHOL DEPENDENCE (HCC): Status: ACTIVE | Noted: 2020-05-21

## 2020-05-21 LAB
6-ACETYL MORPHINE: NEGATIVE
ALBUMIN SERPL-MCNC: 4.96 G/DL (ref 3.5–5.2)
ALBUMIN/GLOB SERPL: 1.8 G/DL
ALP SERPL-CCNC: 53 U/L (ref 39–117)
ALT SERPL W P-5'-P-CCNC: 47 U/L (ref 1–41)
AMPHET+METHAMPHET UR QL: NEGATIVE
ANION GAP SERPL CALCULATED.3IONS-SCNC: 14 MMOL/L (ref 5–15)
AST SERPL-CCNC: 32 U/L (ref 1–40)
BARBITURATES UR QL SCN: NEGATIVE
BASOPHILS # BLD AUTO: 0.04 10*3/MM3 (ref 0–0.2)
BASOPHILS NFR BLD AUTO: 0.5 % (ref 0–1.5)
BENZODIAZ UR QL SCN: NEGATIVE
BILIRUB SERPL-MCNC: 0.7 MG/DL (ref 0.2–1.2)
BILIRUB UR QL STRIP: NEGATIVE
BUN BLD-MCNC: 6 MG/DL (ref 6–20)
BUN/CREAT SERPL: 7.5 (ref 7–25)
BUPRENORPHINE SERPL-MCNC: NEGATIVE NG/ML
CALCIUM SPEC-SCNC: 9.3 MG/DL (ref 8.6–10.5)
CANNABINOIDS SERPL QL: POSITIVE
CHLORIDE SERPL-SCNC: 89 MMOL/L (ref 98–107)
CLARITY UR: CLEAR
CO2 SERPL-SCNC: 25 MMOL/L (ref 22–29)
COCAINE UR QL: NEGATIVE
COLOR UR: YELLOW
CREAT BLD-MCNC: 0.8 MG/DL (ref 0.76–1.27)
DEPRECATED RDW RBC AUTO: 42 FL (ref 37–54)
EOSINOPHIL # BLD AUTO: 0.17 10*3/MM3 (ref 0–0.4)
EOSINOPHIL NFR BLD AUTO: 1.9 % (ref 0.3–6.2)
ERYTHROCYTE [DISTWIDTH] IN BLOOD BY AUTOMATED COUNT: 12.5 % (ref 12.3–15.4)
ETHANOL BLD-MCNC: 112 MG/DL (ref 0–10)
ETHANOL BLD-MCNC: 182 MG/DL (ref 0–10)
ETHANOL UR QL: 0.11 %
ETHANOL UR QL: 0.18 %
GFR SERPL CREATININE-BSD FRML MDRD: 106 ML/MIN/1.73
GLOBULIN UR ELPH-MCNC: 2.7 GM/DL
GLUCOSE BLD-MCNC: 104 MG/DL (ref 65–99)
GLUCOSE UR STRIP-MCNC: NEGATIVE MG/DL
HCT VFR BLD AUTO: 47.1 % (ref 37.5–51)
HGB BLD-MCNC: 16.2 G/DL (ref 13–17.7)
HGB UR QL STRIP.AUTO: NEGATIVE
IMM GRANULOCYTES # BLD AUTO: 0.05 10*3/MM3 (ref 0–0.05)
IMM GRANULOCYTES NFR BLD AUTO: 0.6 % (ref 0–0.5)
KETONES UR QL STRIP: NEGATIVE
LEUKOCYTE ESTERASE UR QL STRIP.AUTO: NEGATIVE
LYMPHOCYTES # BLD AUTO: 1.63 10*3/MM3 (ref 0.7–3.1)
LYMPHOCYTES NFR BLD AUTO: 18.7 % (ref 19.6–45.3)
MAGNESIUM SERPL-MCNC: 2 MG/DL (ref 1.6–2.6)
MCH RBC QN AUTO: 31.2 PG (ref 26.6–33)
MCHC RBC AUTO-ENTMCNC: 34.4 G/DL (ref 31.5–35.7)
MCV RBC AUTO: 90.8 FL (ref 79–97)
METHADONE UR QL SCN: NEGATIVE
MONOCYTES # BLD AUTO: 1.09 10*3/MM3 (ref 0.1–0.9)
MONOCYTES NFR BLD AUTO: 12.5 % (ref 5–12)
NEUTROPHILS # BLD AUTO: 5.75 10*3/MM3 (ref 1.7–7)
NEUTROPHILS NFR BLD AUTO: 65.8 % (ref 42.7–76)
NITRITE UR QL STRIP: NEGATIVE
NRBC BLD AUTO-RTO: 0 /100 WBC (ref 0–0.2)
OPIATES UR QL: NEGATIVE
OXYCODONE UR QL SCN: NEGATIVE
PCP UR QL SCN: NEGATIVE
PH UR STRIP.AUTO: 6.5 [PH] (ref 5–8)
PLATELET # BLD AUTO: 231 10*3/MM3 (ref 140–450)
PMV BLD AUTO: 9.4 FL (ref 6–12)
POTASSIUM BLD-SCNC: 4.3 MMOL/L (ref 3.5–5.2)
PROT SERPL-MCNC: 7.7 G/DL (ref 6–8.5)
PROT UR QL STRIP: NEGATIVE
RBC # BLD AUTO: 5.19 10*6/MM3 (ref 4.14–5.8)
SODIUM BLD-SCNC: 128 MMOL/L (ref 136–145)
SP GR UR STRIP: <=1.005 (ref 1–1.03)
UROBILINOGEN UR QL STRIP: NORMAL
WBC NRBC COR # BLD: 8.73 10*3/MM3 (ref 3.4–10.8)

## 2020-05-21 PROCEDURE — 80307 DRUG TEST PRSMV CHEM ANLYZR: CPT | Performed by: PHYSICIAN ASSISTANT

## 2020-05-21 PROCEDURE — 81003 URINALYSIS AUTO W/O SCOPE: CPT | Performed by: PHYSICIAN ASSISTANT

## 2020-05-21 PROCEDURE — 93010 ELECTROCARDIOGRAM REPORT: CPT | Performed by: INTERNAL MEDICINE

## 2020-05-21 PROCEDURE — 85025 COMPLETE CBC W/AUTO DIFF WBC: CPT | Performed by: PHYSICIAN ASSISTANT

## 2020-05-21 PROCEDURE — 25010000002 THIAMINE PER 100 MG: Performed by: PHYSICIAN ASSISTANT

## 2020-05-21 PROCEDURE — 80053 COMPREHEN METABOLIC PANEL: CPT | Performed by: PHYSICIAN ASSISTANT

## 2020-05-21 PROCEDURE — 25010000002 MAGNESIUM SULFATE PER 500 MG OF MAGNESIUM: Performed by: PHYSICIAN ASSISTANT

## 2020-05-21 PROCEDURE — HZ2ZZZZ DETOXIFICATION SERVICES FOR SUBSTANCE ABUSE TREATMENT: ICD-10-PCS | Performed by: PSYCHIATRY & NEUROLOGY

## 2020-05-21 PROCEDURE — 93005 ELECTROCARDIOGRAM TRACING: CPT | Performed by: PSYCHIATRY & NEUROLOGY

## 2020-05-21 PROCEDURE — 99284 EMERGENCY DEPT VISIT MOD MDM: CPT

## 2020-05-21 PROCEDURE — 83735 ASSAY OF MAGNESIUM: CPT | Performed by: PHYSICIAN ASSISTANT

## 2020-05-21 PROCEDURE — 96365 THER/PROPH/DIAG IV INF INIT: CPT

## 2020-05-21 RX ORDER — ALUMINA, MAGNESIA, AND SIMETHICONE 2400; 2400; 240 MG/30ML; MG/30ML; MG/30ML
15 SUSPENSION ORAL EVERY 6 HOURS PRN
Status: DISCONTINUED | OUTPATIENT
Start: 2020-05-21 | End: 2020-05-24 | Stop reason: HOSPADM

## 2020-05-21 RX ORDER — LORAZEPAM 2 MG/1
2 TABLET ORAL EVERY 4 HOURS PRN
Status: ACTIVE | OUTPATIENT
Start: 2020-05-22 | End: 2020-05-23

## 2020-05-21 RX ORDER — ONDANSETRON 4 MG/1
4 TABLET, FILM COATED ORAL EVERY 6 HOURS PRN
Status: DISCONTINUED | OUTPATIENT
Start: 2020-05-21 | End: 2020-05-24 | Stop reason: HOSPADM

## 2020-05-21 RX ORDER — HYDROXYZINE HYDROCHLORIDE 25 MG/1
25 TABLET, FILM COATED ORAL EVERY MORNING
COMMUNITY
End: 2022-04-15

## 2020-05-21 RX ORDER — BENZTROPINE MESYLATE 1 MG/1
2 TABLET ORAL ONCE AS NEEDED
Status: DISCONTINUED | OUTPATIENT
Start: 2020-05-21 | End: 2020-05-24 | Stop reason: HOSPADM

## 2020-05-21 RX ORDER — BENZONATATE 100 MG/1
100 CAPSULE ORAL 3 TIMES DAILY PRN
Status: DISCONTINUED | OUTPATIENT
Start: 2020-05-21 | End: 2020-05-24 | Stop reason: HOSPADM

## 2020-05-21 RX ORDER — BENZTROPINE MESYLATE 1 MG/ML
1 INJECTION INTRAMUSCULAR; INTRAVENOUS ONCE AS NEEDED
Status: DISCONTINUED | OUTPATIENT
Start: 2020-05-21 | End: 2020-05-24 | Stop reason: HOSPADM

## 2020-05-21 RX ORDER — LORAZEPAM 1 MG/1
1 TABLET ORAL
Status: DISCONTINUED | OUTPATIENT
Start: 2020-05-24 | End: 2020-05-24 | Stop reason: HOSPADM

## 2020-05-21 RX ORDER — HYDROXYZINE 50 MG/1
50 TABLET, FILM COATED ORAL EVERY 6 HOURS PRN
Status: DISCONTINUED | OUTPATIENT
Start: 2020-05-21 | End: 2020-05-24 | Stop reason: HOSPADM

## 2020-05-21 RX ORDER — LOPERAMIDE HYDROCHLORIDE 2 MG/1
2 CAPSULE ORAL
Status: DISCONTINUED | OUTPATIENT
Start: 2020-05-21 | End: 2020-05-24 | Stop reason: HOSPADM

## 2020-05-21 RX ORDER — IBUPROFEN 400 MG/1
400 TABLET ORAL EVERY 6 HOURS PRN
Status: DISCONTINUED | OUTPATIENT
Start: 2020-05-21 | End: 2020-05-24 | Stop reason: HOSPADM

## 2020-05-21 RX ORDER — LORAZEPAM 2 MG/1
2 TABLET ORAL
Status: DISPENSED | OUTPATIENT
Start: 2020-05-21 | End: 2020-05-22

## 2020-05-21 RX ORDER — HYDROXYZINE 50 MG/1
25 TABLET, FILM COATED ORAL EVERY MORNING
Status: CANCELLED | OUTPATIENT
Start: 2020-05-22

## 2020-05-21 RX ORDER — THIAMINE MONONITRATE (VIT B1) 100 MG
100 TABLET ORAL DAILY
Status: DISCONTINUED | OUTPATIENT
Start: 2020-05-21 | End: 2020-05-24 | Stop reason: HOSPADM

## 2020-05-21 RX ORDER — TRAZODONE HYDROCHLORIDE 50 MG/1
50 TABLET ORAL NIGHTLY PRN
Status: DISCONTINUED | OUTPATIENT
Start: 2020-05-21 | End: 2020-05-24 | Stop reason: HOSPADM

## 2020-05-21 RX ORDER — LISINOPRIL AND HYDROCHLOROTHIAZIDE 12.5; 1 MG/1; MG/1
1 TABLET ORAL DAILY
COMMUNITY
End: 2022-04-15

## 2020-05-21 RX ORDER — FAMOTIDINE 20 MG/1
20 TABLET, FILM COATED ORAL 2 TIMES DAILY PRN
Status: DISCONTINUED | OUTPATIENT
Start: 2020-05-21 | End: 2020-05-24 | Stop reason: HOSPADM

## 2020-05-21 RX ORDER — LORAZEPAM 0.5 MG/1
0.5 TABLET ORAL EVERY 4 HOURS PRN
Status: DISCONTINUED | OUTPATIENT
Start: 2020-05-25 | End: 2020-05-24 | Stop reason: HOSPADM

## 2020-05-21 RX ORDER — SODIUM CHLORIDE 0.9 % (FLUSH) 0.9 %
10 SYRINGE (ML) INJECTION AS NEEDED
Status: DISCONTINUED | OUTPATIENT
Start: 2020-05-21 | End: 2020-05-21 | Stop reason: HOSPADM

## 2020-05-21 RX ORDER — ECHINACEA PURPUREA EXTRACT 125 MG
2 TABLET ORAL AS NEEDED
Status: DISCONTINUED | OUTPATIENT
Start: 2020-05-21 | End: 2020-05-24 | Stop reason: HOSPADM

## 2020-05-21 RX ORDER — ACETAMINOPHEN 325 MG/1
650 TABLET ORAL EVERY 6 HOURS PRN
Status: DISCONTINUED | OUTPATIENT
Start: 2020-05-21 | End: 2020-05-21

## 2020-05-21 RX ORDER — LORAZEPAM 1 MG/1
1 TABLET ORAL EVERY 4 HOURS PRN
Status: DISCONTINUED | OUTPATIENT
Start: 2020-05-24 | End: 2020-05-24 | Stop reason: HOSPADM

## 2020-05-21 RX ORDER — HYDROCHLOROTHIAZIDE 25 MG/1
25 TABLET ORAL DAILY
Status: ON HOLD | COMMUNITY
End: 2020-05-21

## 2020-05-21 RX ORDER — MULTIVITAMIN
1 TABLET ORAL DAILY
Status: DISCONTINUED | OUTPATIENT
Start: 2020-05-21 | End: 2020-05-24 | Stop reason: HOSPADM

## 2020-05-21 RX ORDER — LORAZEPAM 2 MG/1
2 TABLET ORAL
Status: COMPLETED | OUTPATIENT
Start: 2020-05-22 | End: 2020-05-22

## 2020-05-21 RX ORDER — LORAZEPAM 0.5 MG/1
0.5 TABLET ORAL
Status: DISCONTINUED | OUTPATIENT
Start: 2020-05-25 | End: 2020-05-24 | Stop reason: HOSPADM

## 2020-05-21 RX ADMIN — Medication 1 TABLET: at 16:31

## 2020-05-21 RX ADMIN — TRAZODONE HYDROCHLORIDE 50 MG: 50 TABLET ORAL at 22:06

## 2020-05-21 RX ADMIN — LORAZEPAM 2 MG: 2 TABLET ORAL at 16:31

## 2020-05-21 RX ADMIN — FOLIC ACID 1000 ML/HR: 5 INJECTION, SOLUTION INTRAMUSCULAR; INTRAVENOUS; SUBCUTANEOUS at 12:25

## 2020-05-21 RX ADMIN — Medication 100 MG: at 16:31

## 2020-05-21 RX ADMIN — HYDROXYZINE HYDROCHLORIDE 50 MG: 50 TABLET ORAL at 16:31

## 2020-05-21 RX ADMIN — HYDROXYZINE HYDROCHLORIDE 50 MG: 50 TABLET ORAL at 22:06

## 2020-05-21 RX ADMIN — LORAZEPAM 2 MG: 2 TABLET ORAL at 20:50

## 2020-05-22 PROBLEM — F41.9 ANXIETY: Status: ACTIVE | Noted: 2020-05-22

## 2020-05-22 PROBLEM — F12.20 TETRAHYDROCANNABINOL (THC) USE DISORDER, MODERATE, DEPENDENCE: Status: ACTIVE | Noted: 2020-05-22

## 2020-05-22 PROBLEM — F17.200 TOBACCO USE DISORDER: Status: ACTIVE | Noted: 2020-05-22

## 2020-05-22 PROCEDURE — 99223 1ST HOSP IP/OBS HIGH 75: CPT | Performed by: PSYCHIATRY & NEUROLOGY

## 2020-05-22 RX ADMIN — Medication 100 MG: at 08:07

## 2020-05-22 RX ADMIN — LORAZEPAM 2 MG: 2 TABLET ORAL at 14:11

## 2020-05-22 RX ADMIN — LORAZEPAM 2 MG: 2 TABLET ORAL at 08:07

## 2020-05-22 RX ADMIN — LISINOPRIL: 10 TABLET ORAL at 08:07

## 2020-05-22 RX ADMIN — HYDROXYZINE HYDROCHLORIDE 50 MG: 50 TABLET ORAL at 21:11

## 2020-05-22 RX ADMIN — TRAZODONE HYDROCHLORIDE 50 MG: 50 TABLET ORAL at 21:11

## 2020-05-22 RX ADMIN — LORAZEPAM 2 MG: 2 TABLET ORAL at 21:11

## 2020-05-22 RX ADMIN — Medication 1 TABLET: at 08:07

## 2020-05-23 PROCEDURE — 99233 SBSQ HOSP IP/OBS HIGH 50: CPT | Performed by: PSYCHIATRY & NEUROLOGY

## 2020-05-23 RX ORDER — PRAZOSIN HYDROCHLORIDE 1 MG/1
1 CAPSULE ORAL NIGHTLY
Status: DISCONTINUED | OUTPATIENT
Start: 2020-05-23 | End: 2020-05-24 | Stop reason: HOSPADM

## 2020-05-23 RX ADMIN — Medication 100 MG: at 08:16

## 2020-05-23 RX ADMIN — LORAZEPAM 1.5 MG: 1 TABLET ORAL at 21:12

## 2020-05-23 RX ADMIN — LORAZEPAM 1.5 MG: 1 TABLET ORAL at 08:16

## 2020-05-23 RX ADMIN — HYDROXYZINE HYDROCHLORIDE 50 MG: 50 TABLET ORAL at 14:04

## 2020-05-23 RX ADMIN — HYDROXYZINE HYDROCHLORIDE 50 MG: 50 TABLET ORAL at 21:11

## 2020-05-23 RX ADMIN — HYDROXYZINE HYDROCHLORIDE 50 MG: 50 TABLET ORAL at 08:16

## 2020-05-23 RX ADMIN — PRAZOSIN HYDROCHLORIDE 1 MG: 1 CAPSULE ORAL at 21:11

## 2020-05-23 RX ADMIN — Medication 1 TABLET: at 08:16

## 2020-05-23 RX ADMIN — TRAZODONE HYDROCHLORIDE 50 MG: 50 TABLET ORAL at 21:13

## 2020-05-23 RX ADMIN — LORAZEPAM 1.5 MG: 1 TABLET ORAL at 14:04

## 2020-05-23 RX ADMIN — LISINOPRIL: 10 TABLET ORAL at 08:16

## 2020-05-24 VITALS
HEIGHT: 71 IN | WEIGHT: 230.4 LBS | TEMPERATURE: 97.5 F | OXYGEN SATURATION: 97 % | DIASTOLIC BLOOD PRESSURE: 97 MMHG | RESPIRATION RATE: 18 BRPM | HEART RATE: 84 BPM | BODY MASS INDEX: 32.26 KG/M2 | SYSTOLIC BLOOD PRESSURE: 137 MMHG

## 2020-05-24 PROCEDURE — 99238 HOSP IP/OBS DSCHRG MGMT 30/<: CPT | Performed by: PSYCHIATRY & NEUROLOGY

## 2020-05-24 RX ORDER — PRAZOSIN HYDROCHLORIDE 1 MG/1
1 CAPSULE ORAL NIGHTLY
Qty: 30 CAPSULE | Refills: 1 | Status: SHIPPED | OUTPATIENT
Start: 2020-05-24 | End: 2021-06-04

## 2020-05-24 RX ADMIN — LISINOPRIL: 10 TABLET ORAL at 08:00

## 2020-05-24 RX ADMIN — Medication 1 TABLET: at 08:00

## 2020-05-24 RX ADMIN — LORAZEPAM 1 MG: 1 TABLET ORAL at 07:58

## 2020-05-24 RX ADMIN — Medication 100 MG: at 08:00

## 2021-06-04 ENCOUNTER — OFFICE VISIT (OUTPATIENT)
Dept: NEUROSURGERY | Facility: CLINIC | Age: 41
End: 2021-06-04

## 2021-06-04 VITALS
RESPIRATION RATE: 18 BRPM | WEIGHT: 239.6 LBS | HEART RATE: 74 BPM | HEIGHT: 71 IN | OXYGEN SATURATION: 98 % | TEMPERATURE: 97.2 F | SYSTOLIC BLOOD PRESSURE: 130 MMHG | BODY MASS INDEX: 33.54 KG/M2 | DIASTOLIC BLOOD PRESSURE: 88 MMHG

## 2021-06-04 DIAGNOSIS — M54.30 SCIATICA, UNSPECIFIED LATERALITY: Primary | ICD-10-CM

## 2021-06-04 PROCEDURE — 99203 OFFICE O/P NEW LOW 30 MIN: CPT | Performed by: PHYSICIAN ASSISTANT

## 2021-06-04 RX ORDER — LISINOPRIL AND HYDROCHLOROTHIAZIDE 20; 12.5 MG/1; MG/1
1 TABLET ORAL DAILY
COMMUNITY
Start: 2021-05-17

## 2021-06-04 RX ORDER — GABAPENTIN 300 MG/1
300 CAPSULE ORAL 3 TIMES DAILY
Qty: 90 CAPSULE | Refills: 1 | Status: CANCELLED | OUTPATIENT
Start: 2021-06-04

## 2021-06-04 RX ORDER — HYDROCHLOROTHIAZIDE 12.5 MG/1
12.5 TABLET ORAL EVERY MORNING
COMMUNITY
Start: 2021-05-27 | End: 2022-04-15

## 2021-06-04 RX ORDER — MELOXICAM 15 MG/1
15 TABLET ORAL DAILY
Qty: 60 TABLET | Refills: 1 | Status: SHIPPED | OUTPATIENT
Start: 2021-06-04 | End: 2021-10-04

## 2021-06-04 RX ORDER — GABAPENTIN 300 MG/1
300 CAPSULE ORAL 3 TIMES DAILY
Qty: 90 CAPSULE | Refills: 0 | Status: SHIPPED | OUTPATIENT
Start: 2021-06-04 | End: 2021-07-02 | Stop reason: SDUPTHER
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
## 2021-07-02 ENCOUNTER — TELEPHONE (OUTPATIENT)
Dept: NEUROSURGERY | Facility: CLINIC | Age: 41
End: 2021-07-02

## 2021-07-02 DIAGNOSIS — M54.30 SCIATICA, UNSPECIFIED LATERALITY: ICD-10-CM

## 2021-07-02 RX ORDER — GABAPENTIN 300 MG/1
300 CAPSULE ORAL 3 TIMES DAILY
Qty: 90 CAPSULE | Refills: 1 | Status: SHIPPED | OUTPATIENT
Start: 2021-07-02 | End: 2021-07-16 | Stop reason: SDUPTHER

## 2021-07-02 NOTE — TELEPHONE ENCOUNTER
Provider:  Chilango KILPATRICK  Caller:   Time of call:   9:22  Phone #:  [phone]  Surgery:  no  Surgery Date:    Last visit:   06/04/21  Next visit: 07/16/21    BETO:     06/04/2021 Gabapentin 300MG 1978 90 30 MARCELINO HAIDER [facility] 1    Reason for call:     Patient reqeusts refill on Gabapentin.

## 2021-07-02 NOTE — TELEPHONE ENCOUNTER
Caller: DAMIAN WARNER    Relationship: Emergency Contact    Best call back number:[phone]    Medication needed: gabapentin (NEURONTIN) 300 MG capsule      When do you need the refill by:07/06/21    What additional details did the patient provide when requesting the medication:PTS WIFE CALLED AND STATED THE PT WILL RUN OUT OF MEDICINE BY 07/06/21    Does the patient have less than a 3 day supply:  [x] Yes  [] No    What is the patient's preferred pharmacy: [facility]

## 2021-07-16 ENCOUNTER — OFFICE VISIT (OUTPATIENT)
Dept: NEUROSURGERY | Facility: CLINIC | Age: 41
End: 2021-07-16

## 2021-07-16 VITALS
DIASTOLIC BLOOD PRESSURE: 72 MMHG | WEIGHT: 240.6 LBS | SYSTOLIC BLOOD PRESSURE: 116 MMHG | BODY MASS INDEX: 33.68 KG/M2 | TEMPERATURE: 97.1 F | HEIGHT: 71 IN

## 2021-07-16 DIAGNOSIS — M51.16 LUMBAR DISC HERNIATION WITH RADICULOPATHY: Primary | ICD-10-CM

## 2021-07-16 DIAGNOSIS — M54.30 SCIATICA, UNSPECIFIED LATERALITY: ICD-10-CM

## 2021-07-16 PROCEDURE — 99213 OFFICE O/P EST LOW 20 MIN: CPT | Performed by: PHYSICIAN ASSISTANT

## 2021-07-16 RX ORDER — AMOXICILLIN 500 MG/1
500 CAPSULE ORAL 3 TIMES DAILY
COMMUNITY
Start: 2021-07-14 | End: 2022-04-15

## 2021-07-16 RX ORDER — IBUPROFEN 800 MG/1
800 TABLET ORAL EVERY 6 HOURS PRN
COMMUNITY
Start: 2021-07-14 | End: 2022-04-15

## 2021-07-16 RX ORDER — ACETAMINOPHEN AND CODEINE PHOSPHATE 300; 30 MG/1; MG/1
1 TABLET ORAL EVERY 6 HOURS PRN
COMMUNITY
Start: 2021-07-14 | End: 2022-04-15

## 2021-07-16 RX ORDER — CYCLOBENZAPRINE HCL 10 MG
10 TABLET ORAL NIGHTLY PRN
Qty: 30 TABLET | Refills: 1 | Status: SHIPPED | OUTPATIENT
Start: 2021-07-16 | End: 2022-04-15

## 2021-07-16 RX ORDER — GABAPENTIN 300 MG/1
300 CAPSULE ORAL TAKE AS DIRECTED
Qty: 120 CAPSULE | Refills: 2 | Status: SHIPPED | OUTPATIENT
Start: 2021-07-16 | End: 2021-09-10

## 2021-07-16 NOTE — PROGRESS NOTES
Subjective     Chief Complaint: Low back, right leg pain    Patient ID: Favio Perez is a 41 y.o. male is here today for follow-up.    History of Present Illness    This is a 41-year-old gentleman that we have been following for low back pain and right-sided lumbosacral radiculopathy.  He was started on gabapentin at his last office visit and reports significant improvement in his leg pain.  He does still have pain that is worse at night.  Pain radiates into his buttock and down the back of his right leg.  He has been doing physical therapy stretching at home.  He has been reluctant to participate in formal PT or pain management.  He denies any weakness, saddle numbness or bowel or bladder dysfunction.    The following portions of the patient's history were reviewed and updated as appropriate: allergies, current medications, past family history, past medical history, past social history, past surgical history and problem list.    Family history:   Family History   Problem Relation Age of Onset   • Hypertension Brother    • Diabetes Maternal Grandmother    • Arthritis Mother    • Hypertension Mother    • Arthritis Father    • Cancer Father    • Heart disease Father    • Heart disease Paternal Grandfather    • Hypertension Paternal Grandfather        Social history:   Social History     Socioeconomic History   • Marital status:      Spouse name: Not on file   • Number of children: Not on file   • Years of education: Not on file   • Highest education level: Not on file   Tobacco Use   • Smoking status: Current Every Day Smoker     Packs/day: 2.00     Years: 26.00     Pack years: 52.00     Types: Cigarettes   • Smokeless tobacco: Current User     Types: Snuff   • Tobacco comment: dips and smokes daily.    Vaping Use   • Vaping Use: Never used   Substance and Sexual Activity   • Alcohol use: Yes     Alcohol/week: 24.0 standard drinks     Types: 24 Cans of beer per week     Comment: 12-14 beer daily or more    • Drug use: Yes     Types: Marijuana, Other     Comment: etoh   • Sexual activity: Yes     Partners: Female     Birth control/protection: None       Review of Systems   Constitutional: Positive for activity change. Negative for appetite change, chills, diaphoresis, fatigue, fever and unexpected weight change.   HENT: Positive for dental problem, hearing loss and mouth sores. Negative for congestion, drooling, ear discharge, ear pain, facial swelling, nosebleeds, postnasal drip, rhinorrhea, sinus pressure, sinus pain, sneezing, sore throat, tinnitus, trouble swallowing and voice change.    Eyes: Positive for discharge. Negative for photophobia, pain, redness, itching and visual disturbance.   Respiratory: Positive for cough. Negative for apnea, choking, chest tightness, shortness of breath, wheezing and stridor.    Cardiovascular: Positive for leg swelling. Negative for chest pain and palpitations.   Gastrointestinal: Negative for abdominal distention, abdominal pain, anal bleeding, blood in stool, constipation, diarrhea, nausea, rectal pain and vomiting.   Endocrine: Negative for cold intolerance, heat intolerance, polydipsia, polyphagia and polyuria.   Genitourinary: Negative for decreased urine volume, difficulty urinating, dysuria, enuresis, flank pain, frequency, genital sores, hematuria and urgency.   Musculoskeletal: Positive for back pain and joint swelling. Negative for arthralgias, gait problem, myalgias, neck pain and neck stiffness.   Skin: Negative for color change, pallor, rash and wound.   Allergic/Immunologic: Negative for environmental allergies, food allergies and immunocompromised state.   Neurological: Negative for dizziness, tremors, seizures, syncope, facial asymmetry, speech difficulty, weakness, light-headedness, numbness and headaches.   Hematological: Negative for adenopathy. Does not bruise/bleed easily.   Psychiatric/Behavioral: Positive for agitation. Negative for behavioral problems,  "confusion, decreased concentration, dysphoric mood, hallucinations, self-injury, sleep disturbance and suicidal ideas. The patient is nervous/anxious. The patient is not hyperactive.        Objective   Blood pressure 116/72, temperature 97.1 °F (36.2 °C), temperature source Infrared, height 180.3 cm (71\"), weight 109 kg (240 lb 9.6 oz).  Body mass index is 33.56 kg/m².  Patient's Body mass index is 33.56 kg/m². indicating that he is obese (BMI >30). Obesity-related health conditions include the following: diabetes mellitus and osteoarthritis. Obesity is unchanged. BMI is is above average; BMI management plan is completed. We discussed portion control and increasing exercise..      Physical Exam  Constitutional:       Appearance: Normal appearance. He is obese.   HENT:      Head: Normocephalic and atraumatic.   Eyes:      Conjunctiva/sclera: Conjunctivae normal.   Pulmonary:      Effort: Pulmonary effort is normal.   Musculoskeletal:      Cervical back: Normal range of motion.   Skin:     General: Skin is warm and dry.   Neurological:      General: No focal deficit present.      Mental Status: He is alert.      GCS: GCS eye subscore is 4. GCS verbal subscore is 5. GCS motor subscore is 6.      Motor: Motor function is intact.      Gait: Gait is intact. Tandem walk normal.   Psychiatric:         Mood and Affect: Mood normal.         Behavior: Behavior normal.           Assessment/Plan     This a 41-year-old gentleman with a right-sided lumbosacral radiculopathy and a lumbar disc herniation.  His symptoms are improving with gabapentin.  I did again offer to refer him to PT, but he has been reluctant to participate in formal physical therapy and would like to continue with exercises/stretching at home.  I have discussed pain management and injections with the patient, again he is reluctant to try this but states he will meet with them for a consultation.  He will continue on gabapentin.  He does not want surgery.  We " discussed continuing to work on weight loss.  Signs and symptoms of cauda equina were reviewed with him.  He was instructed to call with new or worsening symptoms.  I will follow-up with him in 6 weeks, or sooner if clinically indicated.    Diagnoses and all orders for this visit:    1. Lumbar disc herniation with radiculopathy (Primary)  -     Ambulatory Referral to Pain Management    2. Sciatica, unspecified laterality  -     gabapentin (NEURONTIN) 300 MG capsule; Take 1 capsule by mouth Take As Directed. Take 1 capsule in the morning, 1 capsule at lunch and 2 capsules at bedtime  Dispense: 120 capsule; Refill: 2    Other orders  -     cyclobenzaprine (FLEXERIL) 10 MG tablet; Take 1 tablet by mouth At Night As Needed for Muscle Spasms.  Dispense: 30 tablet; Refill: 1        Return in about 6 weeks (around 8/27/2021), or if symptoms worsen or fail to improve.             Sis Kee PA-C

## 2021-09-09 DIAGNOSIS — M54.30 SCIATICA, UNSPECIFIED LATERALITY: ICD-10-CM

## 2021-09-09 NOTE — TELEPHONE ENCOUNTER
Provider:  Vale  Caller:  Automated refill request  Surgery:  NA  Surgery Date:    Last visit:  Office Visit with Sis Kee PA-C (07/16/2021)    Next visit: NA    Reason for call:         Requested Prescriptions     Pending Prescriptions Disp Refills   • gabapentin (NEURONTIN) 300 MG capsule [Pharmacy Med Name: GABAPENTIN 300MG CAPSULES] 90 capsule      Sig: TAKE ONE CAPSULE BY MOUTH THREE TIMES DAILY     Justin:     07/14/2021 [facility]etamin[facility]/Codeine 300[facility]30MG 1978 16 [facility]  08/03/2021 Acetaminophen/Codeine 300MG/30MG 1978 12 3 YU RODRIGUEZ [facility] 18 1  08/07/2021 Gabapentin 300MG 1978 90 30 VALE QUEVEDO [facility] 1

## 2021-09-09 NOTE — TELEPHONE ENCOUNTER
Can you please call and check on patient? He no showed last office follow up. Is he still taking GBP?

## 2021-09-10 RX ORDER — GABAPENTIN 300 MG/1
CAPSULE ORAL
Qty: 90 CAPSULE | Refills: 0 | Status: SHIPPED | OUTPATIENT
Start: 2021-09-10 | End: 2021-10-05

## 2021-09-10 NOTE — TELEPHONE ENCOUNTER
PTS WIFE CALLED BACK AND STATES THAT THE PT IS STILL CURRENTLY TAKING THE GABAPENTIN-PTS WIFE STATES THAT THE PT IS OUT OF MEDICATION. THANK YOU!

## 2021-10-04 RX ORDER — MELOXICAM 15 MG/1
15 TABLET ORAL DAILY
Qty: 60 TABLET | Refills: 1 | Status: SHIPPED | OUTPATIENT
Start: 2021-10-04 | End: 2022-04-15

## 2021-10-04 NOTE — TELEPHONE ENCOUNTER
Provider:  Chilango  Caller:  Automated refill request  Surgery:  NA  Surgery Date:    Last visit:  Office Visit with Sis Kee PA-C (07/16/2021)     Next visit: NA  .  Requested Prescriptions     Pending Prescriptions Disp Refills   • meloxicam (MOBIC) 15 MG tablet [Pharmacy Med Name: MELOXICAM 15MG TABLETS] 60 tablet 1     Sig: TAKE 1 TABLET BY MOUTH DAILY

## 2021-10-05 DIAGNOSIS — M54.30 SCIATICA, UNSPECIFIED LATERALITY: ICD-10-CM

## 2021-10-05 RX ORDER — GABAPENTIN 300 MG/1
CAPSULE ORAL
Qty: 90 CAPSULE | Refills: 0 | Status: SHIPPED | OUTPATIENT
Start: 2021-10-05 | End: 2021-10-07 | Stop reason: SDUPTHER

## 2021-10-05 NOTE — TELEPHONE ENCOUNTER
Provider:  Thierry Saunders  Caller: pharmacy  Surgery:  adryan  Surgery Date: na   Last visit:  7-16-21   Next visit: adryan    BETO:  08/03/2021 [facility]etamin[facility]Code[facility] 300[facility]30[facility] 1978 12 [facility]  08/07/20[facility]MG 1978 90 [facility]  09/10/2021 Gabapentin 300MG 1978 90 30 [facility]           Reason for call:   Patient is requesting a refill for his gabapentin. Please Advise. Thank you.        Requested Prescriptions     Pending Prescriptions Disp Refills   • gabapentin (NEURONTIN) 300 MG capsule [Pharmacy Med Name: GABAPENTIN 300MG CAPSULES] 90 capsule      Sig: TAKE 1 CAPSULE BY MOUTH THREE TIMES DAILY

## 2021-10-07 DIAGNOSIS — M54.30 SCIATICA, UNSPECIFIED LATERALITY: ICD-10-CM

## 2021-10-07 RX ORDER — GABAPENTIN 300 MG/1
300 CAPSULE ORAL 3 TIMES DAILY
Qty: 90 CAPSULE | Refills: 0 | Status: SHIPPED | OUTPATIENT
Start: 2021-10-07 | End: 2021-11-03 | Stop reason: SDUPTHER

## 2021-10-07 NOTE — TELEPHONE ENCOUNTER
Provider:  Ora  Caller: Patient's wifeNataliia  Time of call:   9:22  Phone #:  [phone]  Surgery:  NA  Surgery Date: NA   Last visit:   07/16/2021  Next visit:  NA    Reason for call:         Patient's wife LVM requesting a refill on patient's Gabapentin.     BETO:         06/04/2021 Gabapentin 300MG 1978 90 30 MARCELINO HAIDER [facility] 1  07/02/2021 Gabapentin 300MG 1978 90 30 VALE QUEVEDO [facility] 1  07/14/2021 Acetaminophen/Codeine 300MG/30MG 1978 16 4 YU MICHAEL [facility] 18 1  08/03/2021 Acetaminophen/Codeine 300MG/30MG 1978 12 [facility]  08/07/2021 Gabapentin 300MG 1978 90 30 VALE QUEVEDO [facility] 1  09/10/2021 Gabapentin 300MG 1978 90 30 VALE QUEVEDO [facility] 1

## 2021-11-03 DIAGNOSIS — M54.30 SCIATICA, UNSPECIFIED LATERALITY: ICD-10-CM

## 2021-11-03 RX ORDER — GABAPENTIN 300 MG/1
300 CAPSULE ORAL 3 TIMES DAILY
Qty: 90 CAPSULE | Refills: 0 | Status: SHIPPED | OUTPATIENT
Start: 2021-11-03 | End: 2021-12-06

## 2021-11-03 NOTE — TELEPHONE ENCOUNTER
Provider:  Chip  Caller:  Patient  Surgery:  NA  Surgery Date: NA    Last visit:  08/25/2021(N/S)  Next visit: NA    Reason for call:    Please see encounter from Edith. Patient is requesting a refill on his Gabapentin. Patient said if he needs to come in for another visit, he would like a telephone visit.     Gabapentin pending, needs signed if approved.         Requested Prescriptions     Pending Prescriptions Disp Refills   • gabapentin (NEURONTIN) 300 MG capsule 90 capsule 0     Sig: Take 1 capsule by mouth 3 (Three) Times a Day.     BETO:    08[facility]07/2021 [facility]  09/10/2021 Gabapentin 300MG 1978 90 30 VALE QUEVEDO [facility] 1  10/10/2021 Gabapentin 300MG 1978 90 30 DANIEL BAKER [facility] 1

## 2021-11-03 NOTE — TELEPHONE ENCOUNTER
Medication was approved. I attempted to notify the patient, and received no answer. LVM for patient stating medication had been sent in.

## 2021-11-03 NOTE — TELEPHONE ENCOUNTER
PATIENT MISSED HIS LAST APPT AND WOULD LIKE A REFILL ON HIS GABAPENTIN 300MG.  DOES PATIENT NEED TO BE SEEN FIRST PRIOR TO REFILL.  PLEASE ADVISE.  IF PATIENT NEEDS ANOTHER VISIT, HE WOULD PREFER TO DO A TELEVISIT    [phone]

## 2021-12-06 DIAGNOSIS — M54.30 SCIATICA, UNSPECIFIED LATERALITY: ICD-10-CM

## 2021-12-06 RX ORDER — GABAPENTIN 300 MG/1
CAPSULE ORAL
Qty: 90 CAPSULE | Refills: 3 | Status: SHIPPED | OUTPATIENT
Start: 2021-12-06 | End: 2022-01-03 | Stop reason: SDUPTHER

## 2021-12-06 NOTE — TELEPHONE ENCOUNTER
Provider:  Chip  Caller:  Automated refill request  Surgery:  NA  Surgery Date:    Last visit:  Office Visit with Sis Kee PA-C (07/16/2021)    Next visit: NA    Reason for call:         Requested Prescriptions     Pending Prescriptions Disp Refills   • gabapentin (NEURONTIN) 300 MG capsule [Pharmacy Med Name: GABAPENTIN 300MG CAPSULES] 90 capsule      Sig: TAKE 1 CAPSULE BY MOUTH THREE TIMES DAILY     Justin:     09/10/2021 Gabapentin 300MG 1978 90 30 VALE QUEVEDO [facility] 1  10/10/2021 Gabapentin 300MG 1978 90 30 DANIEL BAKER [facility] 1  11/07/2021 Gabapentin 300MG 1978 90 30 VALE QUEVEDO [facility] 1

## 2021-12-29 ENCOUNTER — TELEPHONE (OUTPATIENT)
Dept: NEUROSURGERY | Facility: CLINIC | Age: 41
End: 2021-12-29

## 2021-12-29 NOTE — TELEPHONE ENCOUNTER
Provider:  Ora  Caller: Nataliia (Wife)  Time of call:   12:23 to [facility]  Phone #:  [phone]  Surgery:  N/A  Surgery Date:  N/A  Last visit:   Office Visit with Sis Kee PA-C (07/16/2021)    Next visit: ZARA PÉREZ:         Reason for call:     Called and LVM for patient or wife to return my call.

## 2021-12-29 NOTE — TELEPHONE ENCOUNTER
Caller: DAMIAN WARNER    Relationship to patient: Emergency Contact    Best call back number: [phone]    Chief complaint: RADICULAR LEG PAIN    Type of visit: F/U    Requested date: ASAP     If rescheduling, when is the original appointment: N/A (MISSED TELEPHONE VISIT 08/25/21)     Additional notes: PATIENT HAS RECENTLY STARTED A NEW JOB AND IS EXPERIENCING WORSENING RADICULAR PAIN AS A RESULT. PATIENT'S WIFE IS CALLING TO REQUEST A F/U VISIT AND POSSIBLY INCREASE HIS GABAPENTIN DOSAGE. PLEASE ADVISE ON SCHEDULING AND MEDICATION. THANK YOU.

## 2021-12-30 RX ORDER — METHYLPREDNISOLONE 4 MG/1
TABLET ORAL
Qty: 21 TABLET | Refills: 0 | Status: SHIPPED | OUTPATIENT
Start: 2021-12-30 | End: 2022-04-15

## 2021-12-30 NOTE — TELEPHONE ENCOUNTER
Attempted to contact patient this morning, regard yesterdays call. No answer. LVM for the patient to return my call.

## 2021-12-30 NOTE — TELEPHONE ENCOUNTER
"I talked with the patient. Patient stated his pain most severe in the Right Hip, and down the lateral side of his right leg. Patient stated \"I can almost tell you exactly where the nerve runs in this leg.\" Patient stated pain is throbbing/aching, and started getting worse 3-4weeks ago. Patient stated this pain is more severe than the pain he was dealing with back in June.    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 Pain is worse standing/walking. Pain gets better sitting or laying down. Patient currently taking Gabapentin, muscle relaxers, tylenol, advil, and aspirin with no relief from this pain. Pain stated he has almost fallen twice now while standing due to the amount of pain he is in now.     Patient stated he has been trying to work through this pain, but can no longer do so. Wanting to know what needs to be done now.     Patient advised a voicemail can be left when calling back, as he is going to work.   "

## 2021-12-30 NOTE — TELEPHONE ENCOUNTER
"I called and was able to speak with patient.  Patient notes that his pain is \"significant\".  He states that he is basically dragging his leg around because the pain is so severe.  He noted to me that he is likely taking 2-3 gabapentin 300 mg pills in the a.m., another 2-3 going to work, at work and likely when he gets home.  He notes he is taking probably 10-11 a day.  I advised patient in regards to how this is a \"significant\" amount and that he is at his max dose.  He states this is the only way that he is able to get around.  Can we please run a Justin to see how patient is obtaining this much medication?    He states that it is getting worse.  He continues to have not done physical therapy or been seen for pain injections.  He says he does not want surgery.    I am going to call patient and a Medrol Dosepak.  Advised him to continue to take his muscle relaxer.  Patient notes that he works Monday through Thursday and has Fridays off.    Please schedule patient to be seen in our office next Friday with a PA.  I did note that if patient's pain does come so severe that he will need to go to the emergency room for further evaluation.  Reviewed all other signs and symptoms that would warrant him to go to the emergency room.  "

## 2022-01-03 DIAGNOSIS — M54.30 SCIATICA, UNSPECIFIED LATERALITY: ICD-10-CM

## 2022-01-03 RX ORDER — GABAPENTIN 300 MG/1
300 CAPSULE ORAL 3 TIMES DAILY
Qty: 90 CAPSULE | Refills: 3 | Status: SHIPPED | OUTPATIENT
Start: 2022-01-03 | End: 2022-03-23 | Stop reason: SDUPTHER

## 2022-01-03 NOTE — TELEPHONE ENCOUNTER
I spoke to patient. He is scheduled to see Byron MATIAS on 1/7/22. He also asked about getting a refill on gabapentin.

## 2022-01-03 NOTE — TELEPHONE ENCOUNTER
Provider:  Vale  Caller:  Patient request  Surgery:  NA  Surgery Date: NA   Last visit:  Office Visit with Sis Kee PA-C (07/16/2021)  Next visit: 01/07/2022    Reason for call:         Patient refill request for Gabapentin.    Requested Prescriptions     Pending Prescriptions Disp Refills   • gabapentin (NEURONTIN) 300 MG capsule 90 capsule 3     Sig: Take 1 capsule by mouth 3 (Three) Times a Day.     BETO:    10/10/2021 Gabapentin 300MG 1978 90 30 DANIEL BAKER [facility] 1  11/07/2021 Gabapentin 300MG 1978 90 30 VALE QUEVEDO [facility] 1  12/07/2021 Gabapentin 300MG 1978 90 30 VALE QUEVEDO [facility] 1   General

## 2022-03-23 ENCOUNTER — TELEPHONE (OUTPATIENT)
Dept: NEUROSURGERY | Facility: CLINIC | Age: 42
End: 2022-03-23

## 2022-03-23 DIAGNOSIS — M54.30 SCIATICA, UNSPECIFIED LATERALITY: ICD-10-CM

## 2022-03-23 RX ORDER — GABAPENTIN 300 MG/1
300 CAPSULE ORAL 4 TIMES DAILY
Qty: 90 CAPSULE | Refills: 3 | Status: SHIPPED | OUTPATIENT
Start: 2022-03-23 | End: 2022-04-15

## 2022-03-23 NOTE — TELEPHONE ENCOUNTER
S/w pt regarding Gabapentin refill and pt stated that he was needing to discuss trying a different medication with Sis Kee and requested an appointment be scheduled with her to discuss this in person. Please contact pt to schedule an appointment with Sis Kee.

## 2022-03-23 NOTE — TELEPHONE ENCOUNTER
Caller: DINA WARNER    Relationship: SELF    Best call back number: [phone]    Requested Prescriptions:   Requested Prescriptions      No prescriptions requested or ordered in this encounter        Pharmacy where request should be sent:    [facility] [place]   [address]  [phone]      Additional details provided by patient: PT CALLED AND STATES THAT HE TRIED TO GET HIS GABAPENTIN REFILLED AND THE PHARMACY INFORMED HIM THAT HE COULD NOT GET THE REFILL FOR 4 DAYS.  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  PT STATES HE SOMETIMES HAS TO TAKE HIGHER DOSAGES BECAUSE OF HIS JOB.  PT IS CURRENTLY COMPLETELY OUT OF MEDICATION.    Does the patient have less than a 3 day supply:  [x] Yes  [] No    Al MONTERO Rep   03/23/22 15:53 EDT         PLEASE CALL PT  THANK YOU

## 2022-03-23 NOTE — TELEPHONE ENCOUNTER
Tried to call Pt to schedule him an appt w/ [facility] but no answer. Left VM to call back and schedule per in basket request (tel. enc). EC

## 2022-03-30 ENCOUNTER — TELEPHONE (OUTPATIENT)
Dept: NEUROSURGERY | Facility: CLINIC | Age: 42
End: 2022-03-30

## 2022-04-04 ENCOUNTER — TELEPHONE (OUTPATIENT)
Dept: NEUROSURGERY | Facility: CLINIC | Age: 42
End: 2022-04-04

## 2022-04-04 NOTE — TELEPHONE ENCOUNTER
I have tried to call the patient back to see exactly how he is taking it and how many at a time. There was no answer and no way to leave a message.

## 2022-04-04 NOTE — TELEPHONE ENCOUNTER
Provider:   [place]  Caller: Nataliia   Time of call: 12:37  Phone #: [phone]   Surgery:  na  Surgery Date:na    Last visit:   7-16-21  Next visit: 4-15-22    BETO:  02/27/2022 Gabapentin 300MG 1978 90 30 [facility]  03/23/2022 Gabapentin 300MG 1978 90 22 Ran Perez [facility] 1       Reason for call: Nataliia called and wanted to know if the patient could get his Gabapentin in a higher dose. She said that since he has started back to work he is taking more of the 300 MG and he only had 90, that he will be out because he is taking more because of the pain. Can the dose be changed? Please Advise. Thank you.

## 2022-04-15 ENCOUNTER — OFFICE VISIT (OUTPATIENT)
Dept: NEUROSURGERY | Facility: CLINIC | Age: 42
End: 2022-04-15

## 2022-04-15 VITALS
BODY MASS INDEX: 32.11 KG/M2 | SYSTOLIC BLOOD PRESSURE: 120 MMHG | TEMPERATURE: 97.8 F | DIASTOLIC BLOOD PRESSURE: 64 MMHG | HEIGHT: 71 IN | WEIGHT: 229.4 LBS

## 2022-04-15 DIAGNOSIS — M54.41 CHRONIC BILATERAL LOW BACK PAIN WITH RIGHT-SIDED SCIATICA: Primary | ICD-10-CM

## 2022-04-15 DIAGNOSIS — G89.29 CHRONIC BILATERAL LOW BACK PAIN WITH RIGHT-SIDED SCIATICA: Primary | ICD-10-CM

## 2022-04-15 PROCEDURE — 99213 OFFICE O/P EST LOW 20 MIN: CPT | Performed by: PHYSICIAN ASSISTANT

## 2022-04-15 RX ORDER — GABAPENTIN 600 MG/1
600 TABLET ORAL 3 TIMES DAILY
Qty: 90 TABLET | Refills: 0 | Status: ON HOLD | OUTPATIENT
Start: 2022-04-15 | End: 2022-10-13

## 2022-04-15 NOTE — PROGRESS NOTES
Subjective     Chief Complaint:     Patient ID: Favio Perez is a 42 y.o. male seen for consultation today at the request of  No ref. provider found    History of Present Illness    This is a 42-year-old gentleman who we have seen in the past last year for low back and right leg pain with a lumbar disc herniation.  He has been treating his symptoms with conservative therapies including gabapentin and NSAIDs.  Patient works as a  and symptoms are worse with his strenuous job.  He denies any focal weakness, saddle anesthesia or bowel bladder dysfunction.  He has tried physical therapy in the past which was not helpful.    The following portions of the patient's history were reviewed and updated as appropriate: allergies, current medications, past family history, past medical history, past social history, past surgical history and problem list.    Family history:   Family History   Problem Relation Age of Onset   • Hypertension Brother    • Diabetes Maternal Grandmother    • Arthritis Mother    • Hypertension Mother    • Arthritis Father    • Cancer Father    • Heart disease Father    • Heart disease Paternal Grandfather    • Hypertension Paternal Grandfather        Social history:   Social History     Socioeconomic History   • Marital status:    Tobacco Use   • Smoking status: Current Every Day Smoker     Packs/day: 2.00     Years: 26.00     Pack years: 52.00     Types: Cigarettes   • Smokeless tobacco: Current User     Types: Snuff   • Tobacco comment: dips and smokes daily.    Vaping Use   • Vaping Use: Never used   Substance and Sexual Activity   • Alcohol use: Yes     Alcohol/week: 24.0 standard drinks     Types: 24 Cans of beer per week     Comment: 12-14 beer daily or more   • Drug use: Yes     Types: Marijuana, Other     Comment: etoh   • Sexual activity: Yes     Partners: Female     Birth control/protection: None       Review of Systems    Objective   Blood pressure 120/64, temperature 97.8  "°F (36.6 °C), temperature source Infrared, height 180.3 cm (71\"), weight 104 kg (229 lb 6.4 oz).  Body mass index is 31.99 kg/m².  Patient's Body mass index is 31.99 kg/m². indicating that he is obese (BMI >30). Obesity-related health conditions include the following: dyslipidemias and osteoarthritis. Obesity is unchanged. BMI is is above average; BMI management plan is completed. We discussed portion control and increasing exercise..      Physical Exam  Constitutional:       Appearance: Normal appearance.   HENT:      Head: Normocephalic and atraumatic.   Eyes:      Conjunctiva/sclera: Conjunctivae normal.   Pulmonary:      Effort: Pulmonary effort is normal.   Musculoskeletal:      Lumbar back: Negative right straight leg raise test and negative left straight leg raise test.   Skin:     General: Skin is warm and dry.   Neurological:      Mental Status: He is alert and oriented to person, place, and time.      GCS: GCS eye subscore is 4. GCS verbal subscore is 5. GCS motor subscore is 6.      Sensory: Sensation is intact.      Motor: Motor function is intact.      Gait: Gait abnormal (Antalgic).      Deep Tendon Reflexes: Reflexes normal.   Psychiatric:         Mood and Affect: Mood normal.         Behavior: Behavior normal.           Assessment/Plan       This is a 42-year-old gentleman with low back pain and right-sided sciatica.  He has attempted treatment with physical therapy and gabapentin without improvement.  I have discussed interventional pain management with him, he is hesitant to try injections.  He would not be a candidate for surgery until he is exhausted all conservative treatment.  Additionally, I discussed the importance of tobacco cessation with him.  I gave him a refill on his gabapentin from 600 mg 3 times a day and discussed with him that further refills will need to come from pain management, he is aware.  I reviewed signs/symptoms of cauda equina syndrome.  If symptoms persist despite PT/PM, " he will need a lumbar myelogram.  We will be happy to follow-up with him after he is exhausted conservative options.    Diagnoses and all orders for this visit:    1. Chronic bilateral low back pain with right-sided sciatica (Primary)  -     Ambulatory Referral to Pain Management    Other orders  -     gabapentin (NEURONTIN) 600 MG tablet; Take 1 tablet by mouth 3 (Three) Times a Day.  Dispense: 90 tablet; Refill: 0        Return if symptoms worsen or fail to improve.             Sis Kee PA-C

## 2022-10-13 ENCOUNTER — HOSPITAL ENCOUNTER (EMERGENCY)
Facility: HOSPITAL | Age: 42
Discharge: PSYCHIATRIC OR SUBSTANCE USE FACILITY | End: 2022-10-13
Attending: STUDENT IN AN ORGANIZED HEALTH CARE EDUCATION/TRAINING PROGRAM | Admitting: STUDENT IN AN ORGANIZED HEALTH CARE EDUCATION/TRAINING PROGRAM

## 2022-10-13 ENCOUNTER — HOSPITAL ENCOUNTER (INPATIENT)
Facility: HOSPITAL | Age: 42
LOS: 5 days | Discharge: HOME | End: 2022-10-18
Attending: PSYCHIATRY & NEUROLOGY | Admitting: PSYCHIATRY & NEUROLOGY

## 2022-10-13 VITALS
RESPIRATION RATE: 16 BRPM | TEMPERATURE: 98.3 F | BODY MASS INDEX: 31.5 KG/M2 | SYSTOLIC BLOOD PRESSURE: 147 MMHG | DIASTOLIC BLOOD PRESSURE: 90 MMHG | WEIGHT: 225 LBS | HEART RATE: 55 BPM | OXYGEN SATURATION: 99 % | HEIGHT: 71 IN

## 2022-10-13 DIAGNOSIS — F10.20 ALCOHOL USE DISORDER, SEVERE, DEPENDENCE: ICD-10-CM

## 2022-10-13 DIAGNOSIS — F10.10 ALCOHOL ABUSE: Primary | ICD-10-CM

## 2022-10-13 LAB
ALBUMIN SERPL-MCNC: 4.93 G/DL (ref 3.5–5.2)
ALBUMIN/GLOB SERPL: 2.2 G/DL
ALP SERPL-CCNC: 59 U/L (ref 39–117)
ALT SERPL W P-5'-P-CCNC: 24 U/L (ref 1–41)
AMPHET+METHAMPHET UR QL: NEGATIVE
AMPHETAMINES UR QL: NEGATIVE
ANION GAP SERPL CALCULATED.3IONS-SCNC: 11.4 MMOL/L (ref 5–15)
AST SERPL-CCNC: 21 U/L (ref 1–40)
BARBITURATES UR QL SCN: NEGATIVE
BASOPHILS # BLD AUTO: 0.03 10*3/MM3 (ref 0–0.2)
BASOPHILS NFR BLD AUTO: 0.3 % (ref 0–1.5)
BENZODIAZ UR QL SCN: NEGATIVE
BILIRUB SERPL-MCNC: 0.9 MG/DL (ref 0–1.2)
BILIRUB UR QL STRIP: NEGATIVE
BUN SERPL-MCNC: 6 MG/DL (ref 6–20)
BUN/CREAT SERPL: 7.3 (ref 7–25)
BUPRENORPHINE SERPL-MCNC: NEGATIVE NG/ML
CALCIUM SPEC-SCNC: 9.4 MG/DL (ref 8.6–10.5)
CANNABINOIDS SERPL QL: POSITIVE
CHLORIDE SERPL-SCNC: 95 MMOL/L (ref 98–107)
CLARITY UR: CLEAR
CO2 SERPL-SCNC: 26.6 MMOL/L (ref 22–29)
COCAINE UR QL: NEGATIVE
COLOR UR: YELLOW
CREAT SERPL-MCNC: 0.82 MG/DL (ref 0.76–1.27)
DEPRECATED RDW RBC AUTO: 44.2 FL (ref 37–54)
EGFRCR SERPLBLD CKD-EPI 2021: 111.1 ML/MIN/1.73
EOSINOPHIL # BLD AUTO: 0.15 10*3/MM3 (ref 0–0.4)
EOSINOPHIL NFR BLD AUTO: 1.5 % (ref 0.3–6.2)
ERYTHROCYTE [DISTWIDTH] IN BLOOD BY AUTOMATED COUNT: 12.8 % (ref 12.3–15.4)
ETHANOL BLD-MCNC: 122 MG/DL (ref 0–10)
ETHANOL BLD-MCNC: 98 MG/DL (ref 0–10)
ETHANOL UR QL: 0.1 %
ETHANOL UR QL: 0.12 %
FLUAV RNA RESP QL NAA+PROBE: NOT DETECTED
FLUBV RNA RESP QL NAA+PROBE: NOT DETECTED
GLOBULIN UR ELPH-MCNC: 2.3 GM/DL
GLUCOSE SERPL-MCNC: 91 MG/DL (ref 65–99)
GLUCOSE UR STRIP-MCNC: NEGATIVE MG/DL
HCT VFR BLD AUTO: 47.1 % (ref 37.5–51)
HGB BLD-MCNC: 16.3 G/DL (ref 13–17.7)
HGB UR QL STRIP.AUTO: NEGATIVE
HOLD SPECIMEN: NORMAL
HOLD SPECIMEN: NORMAL
IMM GRANULOCYTES # BLD AUTO: 0.03 10*3/MM3 (ref 0–0.05)
IMM GRANULOCYTES NFR BLD AUTO: 0.3 % (ref 0–0.5)
KETONES UR QL STRIP: NEGATIVE
LEUKOCYTE ESTERASE UR QL STRIP.AUTO: NEGATIVE
LYMPHOCYTES # BLD AUTO: 1.71 10*3/MM3 (ref 0.7–3.1)
LYMPHOCYTES NFR BLD AUTO: 17 % (ref 19.6–45.3)
MAGNESIUM SERPL-MCNC: 2.3 MG/DL (ref 1.6–2.6)
MCH RBC QN AUTO: 32.4 PG (ref 26.6–33)
MCHC RBC AUTO-ENTMCNC: 34.6 G/DL (ref 31.5–35.7)
MCV RBC AUTO: 93.6 FL (ref 79–97)
METHADONE UR QL SCN: NEGATIVE
MONOCYTES # BLD AUTO: 0.93 10*3/MM3 (ref 0.1–0.9)
MONOCYTES NFR BLD AUTO: 9.3 % (ref 5–12)
NEUTROPHILS NFR BLD AUTO: 7.18 10*3/MM3 (ref 1.7–7)
NEUTROPHILS NFR BLD AUTO: 71.6 % (ref 42.7–76)
NITRITE UR QL STRIP: NEGATIVE
NRBC BLD AUTO-RTO: 0 /100 WBC (ref 0–0.2)
OPIATES UR QL: NEGATIVE
OXYCODONE UR QL SCN: NEGATIVE
PCP UR QL SCN: NEGATIVE
PH UR STRIP.AUTO: 7 [PH] (ref 5–8)
PLATELET # BLD AUTO: 230 10*3/MM3 (ref 140–450)
PMV BLD AUTO: 9.8 FL (ref 6–12)
POTASSIUM SERPL-SCNC: 4.3 MMOL/L (ref 3.5–5.2)
PROPOXYPH UR QL: NEGATIVE
PROT SERPL-MCNC: 7.2 G/DL (ref 6–8.5)
PROT UR QL STRIP: NEGATIVE
RBC # BLD AUTO: 5.03 10*6/MM3 (ref 4.14–5.8)
SARS-COV-2 RNA RESP QL NAA+PROBE: NOT DETECTED
SODIUM SERPL-SCNC: 133 MMOL/L (ref 136–145)
SP GR UR STRIP: <=1.005 (ref 1–1.03)
TRICYCLICS UR QL SCN: NEGATIVE
TSH SERPL DL<=0.05 MIU/L-ACNC: 1.6 UIU/ML (ref 0.27–4.2)
UROBILINOGEN UR QL STRIP: NORMAL
WBC NRBC COR # BLD: 10.03 10*3/MM3 (ref 3.4–10.8)
WHOLE BLOOD HOLD COAG: NORMAL
WHOLE BLOOD HOLD SPECIMEN: NORMAL

## 2022-10-13 PROCEDURE — 80306 DRUG TEST PRSMV INSTRMNT: CPT | Performed by: PHYSICIAN ASSISTANT

## 2022-10-13 PROCEDURE — 85025 COMPLETE CBC W/AUTO DIFF WBC: CPT | Performed by: PHYSICIAN ASSISTANT

## 2022-10-13 PROCEDURE — 82077 ASSAY SPEC XCP UR&BREATH IA: CPT | Performed by: NURSE PRACTITIONER

## 2022-10-13 PROCEDURE — HZ2ZZZZ DETOXIFICATION SERVICES FOR SUBSTANCE ABUSE TREATMENT: ICD-10-PCS | Performed by: PSYCHIATRY & NEUROLOGY

## 2022-10-13 PROCEDURE — 80053 COMPREHEN METABOLIC PANEL: CPT | Performed by: PHYSICIAN ASSISTANT

## 2022-10-13 PROCEDURE — 81003 URINALYSIS AUTO W/O SCOPE: CPT | Performed by: PHYSICIAN ASSISTANT

## 2022-10-13 PROCEDURE — 25010000002 THIAMINE PER 100 MG: Performed by: NURSE PRACTITIONER

## 2022-10-13 PROCEDURE — 82077 ASSAY SPEC XCP UR&BREATH IA: CPT | Performed by: PHYSICIAN ASSISTANT

## 2022-10-13 PROCEDURE — 87636 SARSCOV2 & INF A&B AMP PRB: CPT | Performed by: NURSE PRACTITIONER

## 2022-10-13 PROCEDURE — 83735 ASSAY OF MAGNESIUM: CPT | Performed by: PHYSICIAN ASSISTANT

## 2022-10-13 PROCEDURE — 96365 THER/PROPH/DIAG IV INF INIT: CPT

## 2022-10-13 PROCEDURE — 84443 ASSAY THYROID STIM HORMONE: CPT | Performed by: PHYSICIAN ASSISTANT

## 2022-10-13 PROCEDURE — 99285 EMERGENCY DEPT VISIT HI MDM: CPT

## 2022-10-13 PROCEDURE — 93005 ELECTROCARDIOGRAM TRACING: CPT | Performed by: PSYCHIATRY & NEUROLOGY

## 2022-10-13 PROCEDURE — 36415 COLL VENOUS BLD VENIPUNCTURE: CPT

## 2022-10-13 RX ORDER — LORAZEPAM 0.5 MG/1
0.5 TABLET ORAL EVERY 4 HOURS PRN
Status: ACTIVE | OUTPATIENT
Start: 2022-10-17 | End: 2022-10-18

## 2022-10-13 RX ORDER — GABAPENTIN 300 MG/1
300 CAPSULE ORAL 3 TIMES DAILY
Status: CANCELLED | OUTPATIENT
Start: 2022-10-13

## 2022-10-13 RX ORDER — IBUPROFEN 400 MG/1
400 TABLET ORAL EVERY 6 HOURS PRN
Status: DISCONTINUED | OUTPATIENT
Start: 2022-10-13 | End: 2022-10-18 | Stop reason: HOSPADM

## 2022-10-13 RX ORDER — LORAZEPAM 2 MG/1
2 TABLET ORAL EVERY 4 HOURS PRN
Status: ACTIVE | OUTPATIENT
Start: 2022-10-14 | End: 2022-10-15

## 2022-10-13 RX ORDER — ACETAMINOPHEN 325 MG/1
650 TABLET ORAL EVERY 6 HOURS PRN
Status: DISCONTINUED | OUTPATIENT
Start: 2022-10-13 | End: 2022-10-18 | Stop reason: HOSPADM

## 2022-10-13 RX ORDER — MULTIVITAMIN WITH IRON
2 TABLET ORAL DAILY
Status: DISCONTINUED | OUTPATIENT
Start: 2022-10-13 | End: 2022-10-18 | Stop reason: HOSPADM

## 2022-10-13 RX ORDER — ALUMINA, MAGNESIA, AND SIMETHICONE 2400; 2400; 240 MG/30ML; MG/30ML; MG/30ML
15 SUSPENSION ORAL EVERY 6 HOURS PRN
Status: DISCONTINUED | OUTPATIENT
Start: 2022-10-13 | End: 2022-10-18 | Stop reason: HOSPADM

## 2022-10-13 RX ORDER — ECHINACEA PURPUREA EXTRACT 125 MG
2 TABLET ORAL AS NEEDED
Status: DISCONTINUED | OUTPATIENT
Start: 2022-10-13 | End: 2022-10-18 | Stop reason: HOSPADM

## 2022-10-13 RX ORDER — BENZTROPINE MESYLATE 1 MG/1
2 TABLET ORAL ONCE AS NEEDED
Status: DISCONTINUED | OUTPATIENT
Start: 2022-10-13 | End: 2022-10-18 | Stop reason: HOSPADM

## 2022-10-13 RX ORDER — LORAZEPAM 2 MG/1
2 TABLET ORAL ONCE
Status: DISCONTINUED | OUTPATIENT
Start: 2022-10-13 | End: 2022-10-13 | Stop reason: HOSPADM

## 2022-10-13 RX ORDER — LOPERAMIDE HYDROCHLORIDE 2 MG/1
2 CAPSULE ORAL
Status: DISCONTINUED | OUTPATIENT
Start: 2022-10-13 | End: 2022-10-18 | Stop reason: HOSPADM

## 2022-10-13 RX ORDER — TRAZODONE HYDROCHLORIDE 50 MG/1
50 TABLET ORAL NIGHTLY PRN
Status: DISCONTINUED | OUTPATIENT
Start: 2022-10-13 | End: 2022-10-18 | Stop reason: HOSPADM

## 2022-10-13 RX ORDER — NICOTINE 21 MG/24HR
1 PATCH, TRANSDERMAL 24 HOURS TRANSDERMAL
Status: DISCONTINUED | OUTPATIENT
Start: 2022-10-13 | End: 2022-10-18 | Stop reason: HOSPADM

## 2022-10-13 RX ORDER — LORAZEPAM 2 MG/1
2 TABLET ORAL
Status: COMPLETED | OUTPATIENT
Start: 2022-10-14 | End: 2022-10-14

## 2022-10-13 RX ORDER — MULTIPLE VITAMINS W/ MINERALS TAB 9MG-400MCG
1 TAB ORAL DAILY
Status: DISCONTINUED | OUTPATIENT
Start: 2022-10-13 | End: 2022-10-18 | Stop reason: HOSPADM

## 2022-10-13 RX ORDER — GABAPENTIN 300 MG/1
300 CAPSULE ORAL 3 TIMES DAILY
COMMUNITY

## 2022-10-13 RX ORDER — LORAZEPAM 1 MG/1
1 TABLET ORAL EVERY 4 HOURS PRN
Status: ACTIVE | OUTPATIENT
Start: 2022-10-16 | End: 2022-10-17

## 2022-10-13 RX ORDER — SODIUM CHLORIDE 0.9 % (FLUSH) 0.9 %
10 SYRINGE (ML) INJECTION AS NEEDED
Status: DISCONTINUED | OUTPATIENT
Start: 2022-10-13 | End: 2022-10-13 | Stop reason: HOSPADM

## 2022-10-13 RX ORDER — DULOXETIN HYDROCHLORIDE 30 MG/1
30 CAPSULE, DELAYED RELEASE ORAL DAILY
COMMUNITY

## 2022-10-13 RX ORDER — LORAZEPAM 2 MG/1
2 TABLET ORAL
Status: DISPENSED | OUTPATIENT
Start: 2022-10-13 | End: 2022-10-14

## 2022-10-13 RX ORDER — BENZONATATE 100 MG/1
100 CAPSULE ORAL 3 TIMES DAILY PRN
Status: DISCONTINUED | OUTPATIENT
Start: 2022-10-13 | End: 2022-10-18 | Stop reason: HOSPADM

## 2022-10-13 RX ORDER — DULOXETIN HYDROCHLORIDE 30 MG/1
30 CAPSULE, DELAYED RELEASE ORAL DAILY
Status: DISCONTINUED | OUTPATIENT
Start: 2022-10-14 | End: 2022-10-18 | Stop reason: HOSPADM

## 2022-10-13 RX ORDER — FAMOTIDINE 20 MG/1
20 TABLET, FILM COATED ORAL 2 TIMES DAILY PRN
Status: DISCONTINUED | OUTPATIENT
Start: 2022-10-13 | End: 2022-10-18 | Stop reason: HOSPADM

## 2022-10-13 RX ORDER — HYDROXYZINE 50 MG/1
50 TABLET, FILM COATED ORAL EVERY 6 HOURS PRN
Status: DISCONTINUED | OUTPATIENT
Start: 2022-10-13 | End: 2022-10-18 | Stop reason: HOSPADM

## 2022-10-13 RX ORDER — LORAZEPAM 1 MG/1
1 TABLET ORAL
Status: COMPLETED | OUTPATIENT
Start: 2022-10-16 | End: 2022-10-16

## 2022-10-13 RX ORDER — BENZTROPINE MESYLATE 1 MG/ML
1 INJECTION INTRAMUSCULAR; INTRAVENOUS ONCE AS NEEDED
Status: DISCONTINUED | OUTPATIENT
Start: 2022-10-13 | End: 2022-10-18 | Stop reason: HOSPADM

## 2022-10-13 RX ORDER — LORAZEPAM 0.5 MG/1
0.5 TABLET ORAL
Status: COMPLETED | OUTPATIENT
Start: 2022-10-17 | End: 2022-10-17

## 2022-10-13 RX ORDER — ONDANSETRON 4 MG/1
4 TABLET, FILM COATED ORAL EVERY 6 HOURS PRN
Status: DISCONTINUED | OUTPATIENT
Start: 2022-10-13 | End: 2022-10-18 | Stop reason: HOSPADM

## 2022-10-13 RX ADMIN — Medication 1 TABLET: at 20:19

## 2022-10-13 RX ADMIN — LORAZEPAM 2 MG: 2 TABLET ORAL at 20:24

## 2022-10-13 RX ADMIN — Medication 2 TABLET: at 20:19

## 2022-10-13 RX ADMIN — Medication 100 MG: at 20:19

## 2022-10-13 RX ADMIN — TRAZODONE HYDROCHLORIDE 50 MG: 50 TABLET ORAL at 20:23

## 2022-10-13 RX ADMIN — HYDROXYZINE HYDROCHLORIDE 50 MG: 50 TABLET, FILM COATED ORAL at 20:24

## 2022-10-13 RX ADMIN — THIAMINE HYDROCHLORIDE 1000 ML/HR: 100 INJECTION, SOLUTION INTRAMUSCULAR; INTRAVENOUS at 14:00

## 2022-10-13 NOTE — ED NOTES
MEDICAL SCREENING:    Reason for Visit: Detox     Patient initially seen in triage.  The patient was advised further evaluation and diagnostic testing will be needed, some of the treatment and testing will be initiated in the lobby in order to begin the process.  The patient will be returned to the waiting area for the time being and possibly be re-assessed by a subsequent ED provider.  The patient will be brought back to the treatment area in as timely manner as possible.         Alana Alvarenga PA  10/13/22 1219

## 2022-10-13 NOTE — NURSING NOTE
Pt presents to intake, wishing to detox from alcohol. Last drink was this morning, pt states he drinks 12 16oz beers daily and has been at this rate for the last year.    Pt denies any HI, SI, or AVH.     Pt reports poor sleep, appetite is okay.    Craving - 0/0    Depression 4/10  Anxiety-6/10    CIWA-9     Denies any other substance abuse besides alcohol and marijuana at this time.    Smokes about a joint daily of marijuana for anxiety, last use today. Has used for years.

## 2022-10-13 NOTE — NURSING NOTE
Spoke to doctor Wilkins intake information labs and V/S provided and discussed with provider, instructed to admit with routine SP3 and Ativan protocol orders RVBOX2. Patient and ER provider made aware of admitting orders and plan of care.

## 2022-10-13 NOTE — ED NOTES
Patient advises he is an every day drinker who wants to detox. Patient advises he typically drinks approx 20 beers per day. His last drink was about noon today.

## 2022-10-14 LAB
ALBUMIN SERPL-MCNC: 4.77 G/DL (ref 3.5–5.2)
ALBUMIN/GLOB SERPL: 2 G/DL
ALP SERPL-CCNC: 60 U/L (ref 39–117)
ALT SERPL W P-5'-P-CCNC: 24 U/L (ref 1–41)
ANION GAP SERPL CALCULATED.3IONS-SCNC: 9.9 MMOL/L (ref 5–15)
AST SERPL-CCNC: 22 U/L (ref 1–40)
BILIRUB SERPL-MCNC: 2 MG/DL (ref 0–1.2)
BUN SERPL-MCNC: 7 MG/DL (ref 6–20)
BUN/CREAT SERPL: 7.5 (ref 7–25)
CALCIUM SPEC-SCNC: 9.5 MG/DL (ref 8.6–10.5)
CHLORIDE SERPL-SCNC: 101 MMOL/L (ref 98–107)
CO2 SERPL-SCNC: 26.1 MMOL/L (ref 22–29)
CREAT SERPL-MCNC: 0.93 MG/DL (ref 0.76–1.27)
EGFRCR SERPLBLD CKD-EPI 2021: 103.8 ML/MIN/1.73
GLOBULIN UR ELPH-MCNC: 2.3 GM/DL
GLUCOSE SERPL-MCNC: 88 MG/DL (ref 65–99)
POTASSIUM SERPL-SCNC: 4.4 MMOL/L (ref 3.5–5.2)
PROT SERPL-MCNC: 7.1 G/DL (ref 6–8.5)
QT INTERVAL: 456 MS
QTC INTERVAL: 420 MS
SODIUM SERPL-SCNC: 137 MMOL/L (ref 136–145)

## 2022-10-14 PROCEDURE — 99223 1ST HOSP IP/OBS HIGH 75: CPT | Performed by: PSYCHIATRY & NEUROLOGY

## 2022-10-14 PROCEDURE — 80053 COMPREHEN METABOLIC PANEL: CPT | Performed by: PSYCHIATRY & NEUROLOGY

## 2022-10-14 PROCEDURE — 93010 ELECTROCARDIOGRAM REPORT: CPT | Performed by: INTERNAL MEDICINE

## 2022-10-14 RX ORDER — GABAPENTIN 300 MG/1
300 CAPSULE ORAL 3 TIMES DAILY
Status: DISCONTINUED | OUTPATIENT
Start: 2022-10-14 | End: 2022-10-18 | Stop reason: HOSPADM

## 2022-10-14 RX ADMIN — DULOXETINE HYDROCHLORIDE 30 MG: 30 CAPSULE, DELAYED RELEASE ORAL at 08:44

## 2022-10-14 RX ADMIN — GABAPENTIN 300 MG: 300 CAPSULE ORAL at 16:49

## 2022-10-14 RX ADMIN — LORAZEPAM 2 MG: 2 TABLET ORAL at 14:24

## 2022-10-14 RX ADMIN — GABAPENTIN 300 MG: 300 CAPSULE ORAL at 21:05

## 2022-10-14 RX ADMIN — HYDROCHLOROTHIAZIDE: 12.5 TABLET ORAL at 08:44

## 2022-10-14 RX ADMIN — TRAZODONE HYDROCHLORIDE 50 MG: 50 TABLET ORAL at 21:06

## 2022-10-14 RX ADMIN — LORAZEPAM 2 MG: 2 TABLET ORAL at 21:05

## 2022-10-14 RX ADMIN — Medication 1 TABLET: at 08:44

## 2022-10-14 RX ADMIN — LORAZEPAM 2 MG: 2 TABLET ORAL at 08:45

## 2022-10-14 RX ADMIN — Medication 2 TABLET: at 08:45

## 2022-10-14 RX ADMIN — Medication 100 MG: at 08:45

## 2022-10-14 NOTE — DISCHARGE INSTR - APPOINTMENTS
[facility]  [address], [place] [address] [phone] - phone   [phone] - fax     October 26 2022 at 9:00am  Please arrive 15  minutes early. Bring photo id and insurance card.

## 2022-10-14 NOTE — PLAN OF CARE
Problem: Adult Behavioral Health Plan of Care  Goal: Plan of Care Review  Outcome: Ongoing, Progressing  Flowsheets (Taken 10/14/2022 1327)  Consent Given to Review Plan with: no consent today  Progress: no change  Plan of Care Reviewed With: patient  Patient Agreement with Plan of Care: agrees  Outcome Evaluation: reviewed plan of care and completed adult social history     Problem: Adult Behavioral Health Plan of Care  Goal: Patient-Specific Goal (Individualization)  Outcome: Ongoing, Progressing  Flowsheets  Taken 10/14/2022 1327  Patient-Specific Goals (Include Timeframe): Favio to complete medical detox prior to discharge, identify 1-2 healthy coping skills to assist with relapse prevention during his 3-7 day hospital stay and engage in safe disposition planning prior to discharge.  Individualized Care Needs: Medication management, individual and group therapy  Anxieties, Fears or Concerns: none reported  Taken 10/14/2022 1320  Patient Personal Strengths:   expressive of emotions   expressive of needs   motivated for treatment   resourceful  Patient Vulnerabilities:   history of unsuccessful treatment   substance abuse/addiction     Problem: Adult Behavioral Health Plan of Care  Goal: Optimized Coping Skills in Response to Life Stressors  Outcome: Ongoing, Progressing  Flowsheets (Taken 10/14/2022 1327)  Optimized Coping Skills in Response to Life Stressors: making progress toward outcome     Problem: Adult Behavioral Health Plan of Care  Goal: Optimized Coping Skills in Response to Life Stressors  Intervention: Promote Effective Coping Strategies  Flowsheets (Taken 10/14/2022 1327)  Supportive Measures:   active listening utilized   positive reinforcement provided   self-responsibility promoted   counseling provided   problem-solving facilitated   verbalization of feelings encouraged   decision-making supported   goal-setting facilitated   self-care encouraged   self-reflection promoted     Problem: Adult  Behavioral Health Plan of Care  Goal: Develops/Participates in Therapeutic [place] to Support Successful Transition  Outcome: Ongoing, Progressing  Flowsheets (Taken 10/14/2022 1327)  Develops/Participates in Therapeutic [place] to Support Successful Transition: making progress toward outcome     Problem: Adult Behavioral Health Plan of Care  Goal: Develops/Participates in Therapeutic [place] to Support Successful Transition  Intervention: Foster Therapeutic [place]  Flowsheets (Taken 10/14/2022 1327)  Trust Relationship/Rapport:   care explained   reassurance provided   choices provided   thoughts/feelings acknowledged   emotional support provided   empathic listening provided   questions answered   questions encouraged     Problem: Adult Behavioral Health Plan of Care  Goal: Develops/Participates in Therapeutic [place] to Support Successful Transition  Intervention: Mutually Develop Transition Plan  Flowsheets  Taken 10/14/2022 1327  Transition Support:   community resources reviewed   crisis management plan promoted   follow-up care discussed  Taken 10/14/2022 1318  Discharge Coordination/Progress: Patient has [employer], family for transport and aftercare to be determined.  Transportation Anticipated: family or friend will provide  Current Discharge Risk:   substance use/abuse   psychiatric illness  Concerns to be Addressed:   coping/stress   substance/tobacco abuse/use  Readmission Within the Last 30 Days: no previous admission in last 30 days  Patient/Family Anticipated Services at Transition:   mental health services   outpatient care  Patient's Choice of Community Agency(s): Patient is considering options.  Patient/Family Anticipates Transition to: home with family  Offered/Gave Vendor List: no   Goal Outcome Evaluation:  Plan of Care Reviewed With: patient  Patient Agreement with Plan of Care: agrees  Consent Given to Review Plan with: no consent today  Progress: no change  Outcome Evaluation:  reviewed plan of care and completed adult social history    DATA:         Therapist met individually with patient this date to introduce role and to discuss hospitalization expectations. Patient agreeable. Therapist also met with patient along with Dr. Gregory.     Clinical Maneuvering/Intervention:     Therapist assisted patient in processing above session content; acknowledged and normalized patient’s thoughts, feelings, and concerns.  Discussed the therapist/patient relationship and explain the parameters and limitations of relative confidentiality.  Also discussed the importance of active participation, and honesty to the treatment process.  Encouraged the patient to discuss/vent their feelings, frustrations, and fears concerning their ongoing medical issues and validated their feelings.     Discussed the importance of finding enjoyable activities and coping skills that the patient can engage in a regular basis. Discussed healthy coping skills such as distraction, self love, grounding, thought challenges/reframing, etc.  Provided patient with list of healthy coping skills this date. Discussed the importance of medication compliance.  Praised the patient for seeking help and spent the majority of the session building rapport.       Allowed patient to freely discuss issues without interruption or judgment. Provided safe, confidential environment to facilitate the development of positive therapeutic relationship and encourage open, honest communication.      Therapist addressed discharge safety planning this date. Assisted patient in identifying risk factors which would indicate the need for higher level of care after discharge;  including thoughts to harm self or others and/or self-harming behavior. Encouraged patient to call 911, or present to the nearest emergency room should any of these events occur. Discussed crisis intervention services and means to access.  Encouraged securing any objects of harm.        Therapist completed integrated summary, treatment plan, and initiated social history this date.  Therapist is strongly encouraging family involvement in treatment.       ASSESSMENT:      The patient is a 42 year old  , employed male residing in [facility].  He presented to the ED for medical detox from daily alcohol dependence and withdrawal.  He was last admitted in May of 2020 following his fathers death in 2019 and reports he has been unable to maintain sobriety following his father's death.  He reports drinking on average 12 beers daily  up to 20 beers daily.  He also reports some marijuana use.  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  He reports difficulty being in crowds.   He is not agreeable to residential [facility] treatment.  He signed consent for [facility] for outpatient behavioral health treatment following stabilization.       PLAN:       Patient to remain hospitalized this date.     Treatment team will focus efforts on stabilizing patient's acute symptoms while providing education on healthy coping and crisis management to reduce hospitalizations.   Patient requires daily psychiatrist evaluation and 24/7 nursing supervision to promote patient  safety.     Therapist will offer 1-4 individual sessions, 1 therapy group daily, family education, and appropriate referral.    Therapist recommends residential [facility] treatment however, patient is not agreeable at this time.  He consents to attend [facility] in [facility] for outpatient behavioral health treatment.

## 2022-10-14 NOTE — PLAN OF CARE
Goal Outcome Evaluation:                 Pt slept well, appetite good, and participated in group. Pt  given Ativan 2mg , Trazodone, and Atarax as prn medications.

## 2022-10-14 NOTE — H&P
INITIAL PSYCHIATRIC HISTORY & PHYSICAL    Patient Identification:  Name:   Favio Perez  Age:   42 y.o.  Sex:   male  :   1980  MRN:   [id number]  Visit Number:   [id number]  Primary Care Physician:   Maddy Car MD    SUBJECTIVE    CC/Focus of Exam: detox    HPI: Favio Perez is a 42 y.o. male who was admitted on 10/13/2022 with complaints of alcohol use and withdrawals. The patient reports a long history of substance use. First use was 11 years old. Over time the use increased and the patient  continued to use despite negative consequences. The patient endorses symptoms of tolerance and withdrawals. Has tried to cut down and stop but has not been successful. Spends too much time and resources in pursuit of substance use. Longest period of sobriety is reported to be 1-2 years.  Currently using 20 beers, marijuana  Last use 10-   Patient states that he uses tobacco. Patient denies any history of seizures with withdrawal.  Patient states that he relapsed when his father passed away. Patient states the death of his father as a stressor in his life. Patient denies any history of physical, mental or sexual abuse. Patient rates his appetite as fair. Patient rates his sleep as poor. Patient denies any nightmares. Patient rates his anxiety on a scale of 1/10 with 10 being the most severe a 6. Patient rates his depression on a scale of 1/10 with 10 being the most severe a 4. Patient denies any cravings. Patient's CIWA was 9.  Patient denies any suicidal ideation. Patient denies any homicidal ideation. Patient denies any hallucinations.  Patient was admitted to [facility] psychiatry for further safety and stabilization.    Available medical/psychiatric records reviewed and incorporated into the current document.     PAST PSYCHIATRIC HX: Patient has had 3 prior admissions with the most recent one on 2020-2020. Patient denies any outpatient care.     SUBSTANCE USE HX: UDS was  positive for THC. See HPI for current use.     SOCIAL HX: Patient states that he was born in [place], [place]. Patient states that he was raised in [place], [place]. Patient states that he currently resides with his wife in [place], [place]. Patient states that he is  and has  remarried and has 1 daughter that lives with her mother. 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 Patient states that he has a highschool diploma. Patient denies any legal issues.     Past Medical History:   Diagnosis Date   • Diverticulitis    • ETOH abuse    • HTN (hypertension)    • Hypertension    • Low back pain        Past Surgical History:   Procedure Laterality Date   • COLON RESECTION N/A 11/10/2016    Procedure: COLON RESECTION LAPAROSCOPIC ;  Surgeon: Miky Antoine MD;  Location: [facility] OR;  Service:    • COLONOSCOPY N/A 11/9/2016    Procedure: COLONOSCOPY;  Surgeon: Miky Antoine MD;  Location: [facility] ENDOSCOPY;  Service:    • FRACTURE SURGERY         Family History   Problem Relation Age of Onset   • Hypertension Brother    • Diabetes Maternal Grandmother    • Arthritis Mother    • Hypertension Mother    • Arthritis Father    • Cancer Father    • Heart disease Father    • Heart disease Paternal Grandfather    • Hypertension Paternal Grandfather          Medications Prior to Admission   Medication Sig Dispense Refill Last Dose   • DULoxetine (CYMBALTA) 30 MG capsule Take 1 capsule by mouth Daily.   10/13/2022 at 0930   • gabapentin (NEURONTIN) 300 MG capsule Take 1 capsule by mouth 3 (Three) Times a Day.   10/13/2022 at 0930   • lisinopril-hydrochlorothiazide (PRINZIDE,ZESTORETIC) 20-12.5 MG per tablet Take 1 tablet by mouth Daily.   10/13/2022 at 0930           ALLERGIES:  Patient has no known allergies.    Temp:  [97.2 °F (36.2 °C)-98.7 °F (37.1 °C)] 97.2 °F (36.2 °C)  Heart Rate:  [55-93] 63  Resp:  [16-20] 18  BP: (119-152)/(61-98) 134/83    REVIEW OF SYSTEMS:  Review of Systems   Constitutional:  Negative.    HENT: Negative.    Eyes: Negative.    Respiratory: Negative.    Cardiovascular: Negative.    Gastrointestinal: Negative.    Endocrine: Negative.    Genitourinary: Negative.    Musculoskeletal: Positive for back pain.   Skin: Negative.    Allergic/Immunologic: Negative.    Neurological: Negative.    Hematological: Negative.    Psychiatric/Behavioral: The patient is nervous/anxious.       See HPI for psychiatric ROS  OBJECTIVE    PHYSICAL EXAM:  Physical Exam  Constitutional:  Appears well-developed and well-nourished.   HENT:   Head: Normocephalic and atraumatic.   Right Ear: External ear normal.   Left Ear: External ear normal.   Mouth/Throat: Oropharynx is clear and moist.   Eyes: Pupils are equal, round, and reactive to light. Conjunctivae and EOM are normal.   Neck: Normal range of motion. Neck supple.   Cardiovascular: Normal rate, regular rhythm and normal heart sounds.    Respiratory: Effort normal and breath sounds normal. No respiratory distress. No wheezes.   GI: Soft. Bowel sounds are normal.No distension. There is no tenderness.   Musculoskeletal: Normal range of motion. No edema or deformity.   Neurological:No cranial nerve deficit. Coordination normal.   Skin: Skin is warm and dry. No rash noted. No erythema.       MENTAL STATUS EXAM:               Hygiene:   fair  Cooperation:  Cooperative  Eye Contact:  Good  Psychomotor Behavior:  Appropriate  Affect:  Appropriate  Hopelessness: 5  Speech:  Normal  Linear  Thought Content:  Normal  Suicidal:  None  Homicidal:  None  Hallucinations:  None  Delusion:  None  Memory:  Intact  Orientation:  Person, Place, Time and Situation  Reliability:  fair  Insight:  Fair  Judgement:  Fair  Impulse Control:  Poor      Imaging Results (Last 24 Hours)     ** No results found for the last 24 hours. **           ECG/EMG Results (most recent)     Procedure Component Value Units Date/Time    ECG 12 Lead [[id number]] Collected: 10/14/22 0113     Updated: 10/14/22  0115     QT Interval 456 ms      QTC Interval 420 ms     Narrative:      Test Reason : Baseline Cardiac Status  Blood Pressure :   */*   mmHG  Vent. Rate :  51 BPM     Atrial Rate :  51 BPM     P-R Int : 166 ms          QRS Dur :  94 ms      QT Int : 456 ms       P-R-T Axes :  44  66  54 degrees     QTc Int : 420 ms    Sinus bradycardia  Otherwise normal ECG  When compared with ECG of 21-MAY-2020 15:59,  No significant change was found    Referred By:            Confirmed By:            Lab Results   Component Value Date    GLUCOSE 88 10/14/2022    BUN 7 10/14/2022    CREATININE 0.93 10/14/2022    EGFRIFNONA 106 05/21/2020    BCR 7.5 10/14/2022    CO2 26.1 10/14/2022    CALCIUM 9.5 10/14/2022    ALBUMIN 4.77 10/14/2022    LABIL2 1.3 (L) 07/30/2015    AST 22 10/14/2022    ALT 24 10/14/2022       Lab Results   Component Value Date    WBC 10.03 10/13/2022    HGB 16.3 10/13/2022    HCT 47.1 10/13/2022    MCV 93.6 10/13/2022     10/13/2022       Pain Management Panel     Pain Management Panel Latest Ref Rng & Units 10/13/2022 5/21/2020    AMPHETAMINES SCREEN, URINE Negative Negative Negative    BARBITURATES SCREEN Negative Negative Negative    BENZODIAZEPINE SCREEN, URINE Negative Negative Negative    BUPRENORPHINEUR Negative Negative Negative    COCAINE SCREEN, URINE Negative Negative Negative    METHADONE SCREEN, URINE Negative Negative Negative    METHAMPHETAMINEUR Negative Negative -          Brief Urine Lab Results  (Last result in the past 365 days)      Color   Clarity   Blood   Leuk Est   Nitrite   Protein   CREAT   Urine HCG        10/13/22 1459 Yellow   Clear   Negative   Negative   Negative   Negative                 DATA  Labs reviewed. CMP, CBC, UA are unremarkable. UDS positive for thc. Blood alcohol level 122 mg/dL.   EKG reviewed. QTc 420.   BETO reviewed.   Record reviewed. 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         ASSESSMENT & PLAN:        Alcohol use disorder, severe, dependence (HCC)  -Ativan detox  -Thiamine and folate        Tetrahydrocannabinol (THC) use disorder, moderate, dependence (HCC)  -Supportive treatment, encourage cessation      Nicotine use disorder  -Encourage cessation      Anxiety  -Continue duloxetine      HTN (hypertension)  -Linsinopril/HCTZ      Back pain  -Gabapentin      The patient has been admitted for safety and stabilization.  Patient will be monitored for suicidality daily and maintained on Special  Precautions Level 4 (q30 min checks).  The patient will have individual and group therapy with a master's level therapist. A master treatment plan will be developed and agreed upon by the patient and his/her treatment team.  The patient's estimated length of stay in the hospital is 5-7 days.       Written by Shobha Peter acting as scribe for Dr.Mazhar Gregory signature on this note affirms that the note adequately documents the care provided.   This note was generated using a scribe,   Shobha Peter MA  10/14/22  8:31 AM EDT    ICecelia MD, personally performed the services described in this documentation as scribed by the above named individual in my presence, and it is both accurate and complete.

## 2022-10-14 NOTE — PLAN OF CARE
Problem: Adult Behavioral Health Plan of Care  Goal: Plan of Care Review  Outcome: Ongoing, Progressing  Flowsheets  Taken 10/14/2022 1655  Progress: no change  Plan of Care Reviewed With: patient  Patient Agreement with Plan of Care: agrees  Outcome Evaluation: pt pleasant and cooperative.  Taken 10/14/2022 0700  Plan of Care Reviewed With: patient  Patient Agreement with Plan of Care: agrees   Goal Outcome Evaluation:  Plan of Care Reviewed With: patient  Patient Agreement with Plan of Care: agrees     Progress: no change  Outcome Evaluation: pt pleasant and cooperative.

## 2022-10-15 PROCEDURE — 99232 SBSQ HOSP IP/OBS MODERATE 35: CPT | Performed by: PSYCHIATRY & NEUROLOGY

## 2022-10-15 RX ADMIN — TRAZODONE HYDROCHLORIDE 50 MG: 50 TABLET ORAL at 21:03

## 2022-10-15 RX ADMIN — HYDROCHLOROTHIAZIDE: 12.5 TABLET ORAL at 08:28

## 2022-10-15 RX ADMIN — GABAPENTIN 300 MG: 300 CAPSULE ORAL at 15:04

## 2022-10-15 RX ADMIN — Medication 1 TABLET: at 08:28

## 2022-10-15 RX ADMIN — LORAZEPAM 1.5 MG: 0.5 TABLET ORAL at 21:03

## 2022-10-15 RX ADMIN — HYDROXYZINE HYDROCHLORIDE 50 MG: 50 TABLET, FILM COATED ORAL at 21:03

## 2022-10-15 RX ADMIN — Medication 2 TABLET: at 08:29

## 2022-10-15 RX ADMIN — GABAPENTIN 300 MG: 300 CAPSULE ORAL at 08:29

## 2022-10-15 RX ADMIN — DULOXETINE HYDROCHLORIDE 30 MG: 30 CAPSULE, DELAYED RELEASE ORAL at 08:29

## 2022-10-15 RX ADMIN — LORAZEPAM 1.5 MG: 0.5 TABLET ORAL at 08:29

## 2022-10-15 RX ADMIN — LORAZEPAM 1.5 MG: 0.5 TABLET ORAL at 15:02

## 2022-10-15 RX ADMIN — GABAPENTIN 300 MG: 300 CAPSULE ORAL at 21:03

## 2022-10-15 RX ADMIN — Medication 100 MG: at 08:29

## 2022-10-15 NOTE — PLAN OF CARE
Goal Outcome Evaluation:  Plan of Care Reviewed With: patient  Patient Agreement with Plan of Care: agrees     Progress: improving  Outcome Evaluation: Patient calm and cooperative. Rates depression 2/10. Denies anxiety, cravings, wd's, SI, HI, or Yi. No other issues noted at this time. Will continue to monitor.

## 2022-10-15 NOTE — PROGRESS NOTES
"INPATIENT PSYCHIATRIC PROGRESS NOTE    Name:  Favio Perez  :  1980  MRN:  [id number]  Visit Number:  [id number]  Length of stay:  2    SUBJECTIVE    CC/Focus of Exam: Substance Abuse    INTERVAL HISTORY: Patient reports that he is doing well today.  He denies any withdrawal symptoms.  He is not having medication side effects.  He feels that mood and anxiety symptoms are stable and not symptomatic.  He is not having any issues with sleep or appetite.  He denies SI/HI/AVH.    Depression rating 0/10  Anxiety rating 0/10  Sleep: sleeping well  Withdrawal sx: None  Cravin/10    Review of Systems    OBJECTIVE    Temp:  [97.5 °F (36.4 °C)-98.4 °F (36.9 °C)] 97.5 °F (36.4 °C)  Heart Rate:  [61-92] 78  Resp:  [16-18] 18  BP: (109-125)/(72-87) 125/87    MENTAL STATUS EXAM:  Appearance: Casually dressed, good hygeine.   Cooperation: Cooperative  Psychomotor: No psychomotor agitation/retardation, No EPS, No motor tics  Speech: normal rate, amount.  Mood: \"Fine\"   Affect: congruent, appropriate  Thought Content: goal directed, no delusional material present  Thought process: linear, organized.  Suicidality: No SI  Homicidality: No HI  Perception: No AH/VH  Insight: fair   Judgment: fair    Lab Results (last 24 hours)     ** No results found for the last 24 hours. **             Imaging Results (Last 24 Hours)     ** No results found for the last 24 hours. **             ECG/EMG Results (most recent)     Procedure Component Value Units Date/Time    ECG 12 Lead [[id number]] Collected: 10/14/22 0113     Updated: 10/14/22 1228     QT Interval 456 ms      QTC Interval 420 ms     Narrative:      Test Reason : Baseline Cardiac Status  Blood Pressure :   */*   mmHG  Vent. Rate :  51 BPM     Atrial Rate :  51 BPM     P-R Int : 166 ms          QRS Dur :  94 ms      QT Int : 456 ms       P-R-T Axes :  44  66  54 degrees     QTc Int : 420 ms    Sinus bradycardia  Otherwise normal ECG  When compared with ECG of 21-MAY-2020 " 15:59,  No significant change was found  Confirmed by Dennis Mckinney ([id number]) on 10/14/2022 12:27:29 PM    Referred By:            Confirmed By: Dennis Mckinney           ALLERGIES: Patient has no known allergies.      Current Facility-Administered Medications:   •  acetaminophen (TYLENOL) tablet 650 mg, 650 mg, Oral, Q6H PRN, Odilon Wilkins MD  •  aluminum-magnesium hydroxide-simethicone (MAALOX MAX) 400-400-40 MG/5ML suspension 15 mL, 15 mL, Oral, Q6H PRN, Odilon Wilkins MD  •  B-complex with vitamin C tablet 2 tablet, 2 tablet, Oral, Daily, Odilon Wilkins MD, 2 tablet at 10/15/22 0829  •  benzonatate (TESSALON) capsule 100 mg, 100 mg, Oral, TID PRN, Odilon Wilkins MD  •  benztropine (COGENTIN) tablet 2 mg, 2 mg, Oral, Once PRN **OR** benztropine (COGENTIN) injection 1 mg, 1 mg, Intramuscular, Once PRN, Odilon Wilkins MD  •  DULoxetine (CYMBALTA) DR capsule 30 mg, 30 mg, Oral, Daily, Odilon Wilkins MD, 30 mg at 10/15/22 0829  •  famotidine (PEPCID) tablet 20 mg, 20 mg, Oral, BID PRN, Odilon Wilkins MD  •  gabapentin (NEURONTIN) capsule 300 mg, 300 mg, Oral, TID, Cecelia Gregory MD, 300 mg at 10/15/22 0829  •  hydrOXYzine (ATARAX) tablet 50 mg, 50 mg, Oral, Q6H PRN, Odilon Wilkins MD, 50 mg at 10/13/22 2024  •  ibuprofen (ADVIL,MOTRIN) tablet 400 mg, 400 mg, Oral, Q6H PRN, Odilon Wilkins MD  •  lisinopril (PRINIVIL,ZESTRIL) 20 mg, hydroCHLOROthiazide (HYDRODIURIL) 12.5 mg for ZESTORETIC 20-12.5, , Oral, Daily, Odilon Wilkins MD, Given at 10/15/22 0828  •  loperamide (IMODIUM) capsule 2 mg, 2 mg, Oral, Q2H PRN, Odilon Wilkins MD  •  [COMPLETED] LORazepam (ATIVAN) tablet 2 mg, 2 mg, Oral, 3 times per day, 2 mg at 10/14/22 2105 **FOLLOWED BY** LORazepam (ATIVAN) tablet 1.5 mg, 1.5 mg, Oral, 3 times per day, 1.5 mg at 10/15/22 0829 **FOLLOWED BY** [START ON 10/16/2022] LORazepam (ATIVAN) tablet 1 mg, 1 mg, Oral, 3 times per day **FOLLOWED BY** [START ON 10/17/2022] LORazepam (ATIVAN)  tablet 0.5 mg, 0.5 mg, Oral, 3 times per day, Odilon Wilkins MD  •  [] LORazepam (ATIVAN) tablet 2 mg, 2 mg, Oral, Q4H PRN **FOLLOWED BY** LORazepam (ATIVAN) tablet 1.5 mg, 1.5 mg, Oral, Q4H PRN **FOLLOWED BY** [START ON 10/16/2022] LORazepam (ATIVAN) tablet 1 mg, 1 mg, Oral, Q4H PRN **FOLLOWED BY** [START ON 10/17/2022] LORazepam (ATIVAN) tablet 0.5 mg, 0.5 mg, Oral, Q4H PRN, Odilon Wilkins MD  •  magnesium hydroxide (MILK OF MAGNESIA) suspension 10 mL, 10 mL, Oral, Daily PRN, Odilon Wilkins MD  •  multivitamin with minerals 1 tablet, 1 tablet, Oral, Daily, Odilon Wilkins MD, 1 tablet at 10/15/22 0828  •  nicotine (NICODERM CQ) 21 MG/24HR patch 1 patch, 1 patch, Transdermal, Q24H, Odilon Wilkins MD  •  ondansetron (ZOFRAN) tablet 4 mg, 4 mg, Oral, Q6H PRN, Odilon Wilkins MD  •  sodium chloride nasal spray 2 spray, 2 spray, Each Nare, PRN, Odilon Wilkins MD  •  thiamine (VITAMIN B-1) tablet 100 mg, 100 mg, Oral, Daily, Odilon Wilkins MD, 100 mg at 10/15/22 0829  •  traZODone (DESYREL) tablet 50 mg, 50 mg, Oral, Nightly PRN, Odilon Wilkins MD, 50 mg at 10/14/22 2106    Reviewed chart, notes, vitals, labs and EKG personally reviewed.    ASSESSMENT & PLAN:        Alcohol use disorder, severe, dependence (HCC)  -Ativan detox  -Thiamine and folate  -Improving, no major withdrawal symptoms noted today          Tetrahydrocannabinol (THC) use disorder, moderate, dependence (HCC)  -Supportive treatment, encourage cessation       Nicotine use disorder  -Encourage cessation       Anxiety  -Continue duloxetine       HTN (hypertension)  -Linsinopril/HCTZ       Back pain  -Gabapentin        The patient has been admitted for safety and stabilization.  Patient will be monitored for suicidality daily and maintained on Special  Precautions Level 4 (q30 min checks).  The patient will have individual and group therapy with a master's level therapist. A master treatment plan will be developed and agreed  upon by the patient and his/her treatment team.  The patient's estimated length of stay in the hospital is 5-7 days.     Special precautions: Special Precautions Level 4 (q30 min checks)          Behavioral Health Treatment Plan and Problem List: I have reviewed and approved the Behavioral Health Treatment Plan and Problem list.  The patient has had a chance to review and agrees with the treatment plan.     Clinician:  Conner Gandhi MD  10/15/22  13:27 EDT

## 2022-10-15 NOTE — NURSING NOTE
Pt resting comfortably in bed w/o s/s of any anxiety or distress at this time. Will continue to monitor.

## 2022-10-15 NOTE — PLAN OF CARE
Goal Outcome Evaluation:  Plan of Care Reviewed With: patient  Patient Agreement with Plan of Care: agrees     Progress: no change  Outcome Evaluation: Pt pleasant and cooperative, rating anxiety 5/10 and depression 4/10. Pt denies craving, SI/HI/AVH and states he’s “not really” having any withdrawal sx. Pt states his appetite is slowly getting better. Pt slept throughout the night.

## 2022-10-16 PROCEDURE — 99232 SBSQ HOSP IP/OBS MODERATE 35: CPT | Performed by: PSYCHIATRY & NEUROLOGY

## 2022-10-16 RX ADMIN — HYDROXYZINE HYDROCHLORIDE 50 MG: 50 TABLET, FILM COATED ORAL at 21:18

## 2022-10-16 RX ADMIN — LORAZEPAM 1 MG: 1 TABLET ORAL at 21:17

## 2022-10-16 RX ADMIN — LORAZEPAM 1 MG: 1 TABLET ORAL at 14:09

## 2022-10-16 RX ADMIN — Medication 1 TABLET: at 08:13

## 2022-10-16 RX ADMIN — GABAPENTIN 300 MG: 300 CAPSULE ORAL at 16:04

## 2022-10-16 RX ADMIN — DULOXETINE HYDROCHLORIDE 30 MG: 30 CAPSULE, DELAYED RELEASE ORAL at 08:13

## 2022-10-16 RX ADMIN — HYDROCHLOROTHIAZIDE: 12.5 TABLET ORAL at 08:13

## 2022-10-16 RX ADMIN — GABAPENTIN 300 MG: 300 CAPSULE ORAL at 21:17

## 2022-10-16 RX ADMIN — LORAZEPAM 1 MG: 1 TABLET ORAL at 08:14

## 2022-10-16 RX ADMIN — Medication 100 MG: at 08:13

## 2022-10-16 RX ADMIN — Medication 2 TABLET: at 08:13

## 2022-10-16 RX ADMIN — TRAZODONE HYDROCHLORIDE 50 MG: 50 TABLET ORAL at 21:17

## 2022-10-16 RX ADMIN — GABAPENTIN 300 MG: 300 CAPSULE ORAL at 08:14

## 2022-10-16 NOTE — PLAN OF CARE
Goal Outcome Evaluation:  Plan of Care Reviewed With: patient  Patient Agreement with Plan of Care: agrees     Progress: improving  Outcome Evaluation: Patient calm and cooperative. Rates anxiety 4/10. Depression 2/10. Denies cravings, SI, HI, or Yi. No other issues noted at this time. Will continue to monitor.

## 2022-10-16 NOTE — PLAN OF CARE
Goal Outcome Evaluation:  Plan of Care Reviewed With: patient  Patient Agreement with Plan of Care: agrees     Progress: improving  Outcome Evaluation: Pt is calm and cooperative and at ease during time of assessment, rating anxiety 5/10 and depression 3/10. Pt denies w/d sx and craving as well as SI/HI/AVH. Pt interacts appropriately with staff and peers. Pt slept throughout the night.

## 2022-10-16 NOTE — PROGRESS NOTES
"INPATIENT PSYCHIATRIC PROGRESS NOTE    Name:  Favio Perez  :  1980  MRN:  [id number]  Visit Number:  [id number]  Length of stay:  3    SUBJECTIVE    CC/Focus of Exam: Substance Abuse    INTERVAL HISTORY: Patient continues to do well.  No active withdrawal symptoms today.  He is tolerating detox and denies any major mood or anxiety symptoms.  He reports sleep and appetite are appropriate.  He denies any cravings.  He denies SI/HI/AVH.    Depression rating 0/10  Anxiety rating 0/10  Sleep: sleeping well  Withdrawal sx: None  Cravin/10    Review of Systems    OBJECTIVE    Temp:  [97.3 °F (36.3 °C)-97.8 °F (36.6 °C)] 97.3 °F (36.3 °C)  Heart Rate:  [68-96] 68  Resp:  [18] 18  BP: (123-129)/(87-88) 129/88    MENTAL STATUS EXAM:  Appearance: Casually dressed, good hygeine.   Cooperation: Cooperative  Psychomotor: No psychomotor agitation/retardation, No EPS, No motor tics  Speech: normal rate, amount.  Mood: \"Doing well\"   Affect: congruent, appropriate  Thought Content: goal directed, no delusional material present  Thought process: linear, organized.  Suicidality: No SI  Homicidality: No HI  Perception: No AH/VH  Insight: fair   Judgment: fair    Lab Results (last 24 hours)     ** No results found for the last 24 hours. **             Imaging Results (Last 24 Hours)     ** No results found for the last 24 hours. **             ECG/EMG Results (most recent)     Procedure Component Value Units Date/Time    ECG 12 Lead [[id number]] Collected: 10/14/22 0113     Updated: 10/14/22 1228     QT Interval 456 ms      QTC Interval 420 ms     Narrative:      Test Reason : Baseline Cardiac Status  Blood Pressure :   */*   mmHG  Vent. Rate :  51 BPM     Atrial Rate :  51 BPM     P-R Int : 166 ms          QRS Dur :  94 ms      QT Int : 456 ms       P-R-T Axes :  44  66  54 degrees     QTc Int : 420 ms    Sinus bradycardia  Otherwise normal ECG  When compared with ECG of 21-MAY-2020 15:59,  No significant change was " found  Confirmed by Dennis Mckinney ([id number]) on 10/14/2022 12:27:29 PM    Referred By:            Confirmed By: Dennis Mckinney           ALLERGIES: Patient has no known allergies.      Current Facility-Administered Medications:   •  acetaminophen (TYLENOL) tablet 650 mg, 650 mg, Oral, Q6H PRN, Odilon Wilkins MD  •  aluminum-magnesium hydroxide-simethicone (MAALOX MAX) 400-400-40 MG/5ML suspension 15 mL, 15 mL, Oral, Q6H PRN, Odilon Wilkins MD  •  B-complex with vitamin C tablet 2 tablet, 2 tablet, Oral, Daily, Odilon Wilkins MD, 2 tablet at 10/16/22 0813  •  benzonatate (TESSALON) capsule 100 mg, 100 mg, Oral, TID PRN, Odilon Wilkins MD  •  benztropine (COGENTIN) tablet 2 mg, 2 mg, Oral, Once PRN **OR** benztropine (COGENTIN) injection 1 mg, 1 mg, Intramuscular, Once PRN, Odilon Wilkins MD  •  DULoxetine (CYMBALTA) DR capsule 30 mg, 30 mg, Oral, Daily, Odilon Wilkins MD, 30 mg at 10/16/22 0813  •  famotidine (PEPCID) tablet 20 mg, 20 mg, Oral, BID PRN, Odilon Wilkins MD  •  gabapentin (NEURONTIN) capsule 300 mg, 300 mg, Oral, TID, Cecelia Gregory MD, 300 mg at 10/16/22 0814  •  hydrOXYzine (ATARAX) tablet 50 mg, 50 mg, Oral, Q6H PRN, Odilon Wilkins MD, 50 mg at 10/15/22 2103  •  ibuprofen (ADVIL,MOTRIN) tablet 400 mg, 400 mg, Oral, Q6H PRN, Odilon Wilkins MD  •  lisinopril (PRINIVIL,ZESTRIL) 20 mg, hydroCHLOROthiazide (HYDRODIURIL) 12.5 mg for ZESTORETIC 20-12.5, , Oral, Daily, Odilon Wilkins MD, Given at 10/16/22 0813  •  loperamide (IMODIUM) capsule 2 mg, 2 mg, Oral, Q2H PRN, Odilon Wilkins MD  •  [COMPLETED] LORazepam (ATIVAN) tablet 2 mg, 2 mg, Oral, 3 times per day, 2 mg at 10/14/22 2105 **FOLLOWED BY** [COMPLETED] LORazepam (ATIVAN) tablet 1.5 mg, 1.5 mg, Oral, 3 times per day, 1.5 mg at 10/15/22 2103 **FOLLOWED BY** LORazepam (ATIVAN) tablet 1 mg, 1 mg, Oral, 3 times per day, 1 mg at 10/16/22 0814 **FOLLOWED BY** [START ON 10/17/2022] LORazepam (ATIVAN) tablet 0.5 mg, 0.5 mg,  Oral, 3 times per day, Odilon Wilkins MD  •  [] LORazepam (ATIVAN) tablet 2 mg, 2 mg, Oral, Q4H PRN **FOLLOWED BY** [] LORazepam (ATIVAN) tablet 1.5 mg, 1.5 mg, Oral, Q4H PRN **FOLLOWED BY** LORazepam (ATIVAN) tablet 1 mg, 1 mg, Oral, Q4H PRN **FOLLOWED BY** [START ON 10/17/2022] LORazepam (ATIVAN) tablet 0.5 mg, 0.5 mg, Oral, Q4H PRN, Odilon Wilkins MD  •  magnesium hydroxide (MILK OF MAGNESIA) suspension 10 mL, 10 mL, Oral, Daily PRN, Odilon Wilkins MD  •  multivitamin with minerals 1 tablet, 1 tablet, Oral, Daily, Odilon Wilkins MD, 1 tablet at 10/16/22 0813  •  nicotine (NICODERM CQ) 21 MG/24HR patch 1 patch, 1 patch, Transdermal, Q24H, Odilon Wilkins MD  •  ondansetron (ZOFRAN) tablet 4 mg, 4 mg, Oral, Q6H PRN, Odilon Wilkins MD  •  sodium chloride nasal spray 2 spray, 2 spray, Each Nare, PRN, Odilon Wilkins MD  •  thiamine (VITAMIN B-1) tablet 100 mg, 100 mg, Oral, Daily, Odilon Wilkins MD, 100 mg at 10/16/22 0813  •  traZODone (DESYREL) tablet 50 mg, 50 mg, Oral, Nightly PRN, Odilon Wilkins MD, 50 mg at 10/15/22 2103    Reviewed chart, notes, vitals, labs and EKG personally reviewed.    ASSESSMENT & PLAN:        Alcohol use disorder, severe, dependence (HCC)  -Ativan detox  -Thiamine and folate  -Improving, again no major withdrawal symptoms noted today          Tetrahydrocannabinol (THC) use disorder, moderate, dependence (HCC)  -Supportive treatment, encourage cessation       Nicotine use disorder  -Encourage cessation       Anxiety  -Continue duloxetine       HTN (hypertension)  -Linsinopril/HCTZ       Back pain  -Gabapentin        The patient has been admitted for safety and stabilization.  Patient will be monitored for suicidality daily and maintained on Special  Precautions Level 4 (q30 min checks).  The patient will have individual and group therapy with a master's level therapist. A master treatment plan will be developed and agreed upon by the patient and  his/her treatment team.  The patient's estimated length of stay in the hospital is 5-7 days.     Special precautions: Special Precautions Level 4 (q30 min checks)          Behavioral Health Treatment Plan and Problem List: I have reviewed and approved the Behavioral Health Treatment Plan and Problem list.  The patient has had a chance to review and agrees with the treatment plan.     Clinician:  Conner Gandhi MD  10/16/22  14:06 EDT

## 2022-10-17 PROCEDURE — 99232 SBSQ HOSP IP/OBS MODERATE 35: CPT | Performed by: PSYCHIATRY & NEUROLOGY

## 2022-10-17 RX ADMIN — LORAZEPAM 0.5 MG: 0.5 TABLET ORAL at 14:14

## 2022-10-17 RX ADMIN — Medication 100 MG: at 08:12

## 2022-10-17 RX ADMIN — LORAZEPAM 0.5 MG: 0.5 TABLET ORAL at 08:12

## 2022-10-17 RX ADMIN — LORAZEPAM 0.5 MG: 0.5 TABLET ORAL at 21:16

## 2022-10-17 RX ADMIN — GABAPENTIN 300 MG: 300 CAPSULE ORAL at 15:12

## 2022-10-17 RX ADMIN — Medication 1 TABLET: at 08:11

## 2022-10-17 RX ADMIN — HYDROCHLOROTHIAZIDE: 12.5 TABLET ORAL at 08:11

## 2022-10-17 RX ADMIN — GABAPENTIN 300 MG: 300 CAPSULE ORAL at 08:12

## 2022-10-17 RX ADMIN — DULOXETINE HYDROCHLORIDE 30 MG: 30 CAPSULE, DELAYED RELEASE ORAL at 08:11

## 2022-10-17 RX ADMIN — Medication 2 TABLET: at 08:12

## 2022-10-17 RX ADMIN — GABAPENTIN 300 MG: 300 CAPSULE ORAL at 21:16

## 2022-10-17 RX ADMIN — TRAZODONE HYDROCHLORIDE 50 MG: 50 TABLET ORAL at 21:18

## 2022-10-17 RX ADMIN — HYDROXYZINE HYDROCHLORIDE 50 MG: 50 TABLET, FILM COATED ORAL at 21:16

## 2022-10-17 NOTE — PLAN OF CARE
Problem: Adult Behavioral Health Plan of Care  Goal: Patient-Specific Goal (Individualization)  Outcome: Ongoing, Progressing  Flowsheets  Taken 10/14/2022 1327  Patient-Specific Goals (Include Timeframe): Favio to complete medical detox prior to discharge, identify 1-2 healthy coping skills to assist with relapse prevention during his 3-7 day hospital stay and engage in safe disposition planning prior to discharge.  Individualized Care Needs: Medication management, individual and group therapy  Anxieties, Fears or Concerns: none reported  Taken 10/14/2022 1320  Patient Personal Strengths:   expressive of emotions   expressive of needs   motivated for treatment   resourceful  Patient Vulnerabilities:   history of unsuccessful treatment   substance abuse/addiction     Problem: Adult Behavioral Health Plan of Care  Goal: Optimized Coping Skills in Response to Life Stressors  Outcome: Ongoing, Progressing  Flowsheets (Taken 10/17/2022 1506)  Optimized Coping Skills in Response to Life Stressors: making progress toward outcome     Problem: Adult Behavioral Health Plan of Care  Goal: Optimized Coping Skills in Response to Life Stressors  Intervention: Promote Effective Coping Strategies  Flowsheets (Taken 10/17/2022 1506)  Supportive Measures:   active listening utilized   positive reinforcement provided   self-responsibility promoted   counseling provided   problem-solving facilitated   verbalization of feelings encouraged   decision-making supported   goal-setting facilitated   self-care encouraged   self-reflection promoted     Problem: Adult Behavioral Health Plan of Care  Goal: Develops/Participates in Therapeutic [place] to Support Successful Transition  Outcome: Ongoing, Progressing  Flowsheets (Taken 10/17/2022 1506)  Develops/Participates in Therapeutic [place] to Support Successful Transition: making progress toward outcome     Problem: Adult Behavioral Health Plan of Care  Goal: Develops/Participates in  Therapeutic [place] to Support Successful Transition  Intervention: Foster Therapeutic [place]  Flowsheets (Taken 10/17/2022 1506)  Trust Relationship/Rapport:   care explained   reassurance provided   choices provided   thoughts/feelings acknowledged   emotional support provided   empathic listening provided   questions answered   questions encouraged     Problem: Adult Behavioral Health Plan of Care  Goal: Develops/Participates in Therapeutic [place] to Support Successful Transition  Intervention: Mutually Develop Transition Plan  Flowsheets  Taken 10/17/2022 1506  Transition Support:   community resources reviewed   crisis management plan promoted   crisis management plan verbalized   follow-up care coordinated   follow-up care discussed  Taken 10/17/2022 1500  Discharge Coordination/Progress: Patient has [facility], family for transport and aftercare with [facility].  Transportation Anticipated: family or friend will provide  Current Discharge Risk:   psychiatric illness   substance use/abuse  Concerns to be Addressed:   coping/stress   substance/tobacco abuse/use  Readmission Within the Last 30 Days: no previous admission in last 30 days  Patient/Family Anticipated Services at Transition:   mental health services   outpatient care  Patient's Choice of Community Agency(s): [facility]-consent obtained  Offered/Gave Vendor List: no  Data:  Therapist reviewed Dr. Gregory's assessment, discussed patient with nursing staff and met with patient this date to further discuss patient progress, review healthy coping and safe disposition.      Clinical Maneuvering/Intervention:    Therapist assisted patient in processing above session content; acknowledged and normalized patient's thoughts, feelings and concerns.  Encouraged patient to discuss/vent feelings, frustrations, and fears concerning their ongoing issues and validated patients feelings.  Discussed the importance of healthy  coping and reviewed healthy coping skills such as distraction, thought reframing/redirecting, grounding, mindfulness, etc.  Reviewed safe disposition with patient.    Assessment:  Patient denies suicidal ideation/homicidal ideation.  Patient reports ongoing depression and anxiety today.  Patient states he is feeling much better.  He discussed that he had understood that [employer] has a program for people struggling with alcohol issues and he thought he might want to try and get a job with them.  Efforts were made to contact the company and was unable to gather any information about the program.  Encouraged patient to visit [facility] in person to inquire about the program.  Reviewed healthy coping and safe disposition with patient.    Plan:  Patient will continue hospitalization/medication management. Patient will return home upon stabilization.  Patient will engage in aftercare with [facility].

## 2022-10-17 NOTE — PLAN OF CARE
Goal Outcome Evaluation:  Plan of Care Reviewed With: patient  Patient Agreement with Plan of Care: agrees     Progress: improving  Outcome Evaluation: Pt states he’s “not craving” and denies any w/d sx. Pt rates anxiety/depression 4/10 and denies SI/HI/AVH. Pt pleasant and cooperative and interacts appropriately with staff and peers. Pt slept throughout the night.

## 2022-10-17 NOTE — PROGRESS NOTES
"INPATIENT PSYCHIATRIC PROGRESS NOTE    Name:  Favio Perez  :  1980  MRN:  [id number]  Visit Number:  [id number]  Length of stay:  4    SUBJECTIVE    CC/Focus of Exam: alcohol use    INTERVAL HISTORY:  The patient is on the last day of detox and states it is helping. He states he will be ready to go tomorrow.  Depression rating 3/10  Anxiety rating 3/10  Sleep: good  Withdrawal sx: denies  Cravin/10    Review of Systems   Constitutional: Negative.    Respiratory: Negative.    Cardiovascular: Negative.    Gastrointestinal: Negative.        OBJECTIVE    Temp:  [97.5 °F (36.4 °C)-98 °F (36.7 °C)] 97.5 °F (36.4 °C)  Heart Rate:  [] 66  Resp:  [18] 18  BP: (117-137)/(83-85) 117/83    MENTAL STATUS EXAM:  Appearance:Casually dressed, good hygeine.   Cooperation:Cooperative  Psychomotor: No psychomotor agitation/retardation, No EPS, No motor tics  Speech-normal rate, amount.  Mood \"better\"   Affect-congruent, appropriate, stable  Thought Content-goal directed, no delusional material present  Thought process-linear, organized.  Suicidality: No SI  Homicidality: No HI  Perception: No AH/VH  Insight-fair   Judgement-fair    Lab Results (last 24 hours)     ** No results found for the last 24 hours. **             Imaging Results (Last 24 Hours)     ** No results found for the last 24 hours. **             ECG/EMG Results (most recent)     Procedure Component Value Units Date/Time    ECG 12 Lead [[id number]] Collected: 10/14/22 0113     Updated: 10/14/22 1228     QT Interval 456 ms      QTC Interval 420 ms     Narrative:      Test Reason : Baseline Cardiac Status  Blood Pressure :   */*   mmHG  Vent. Rate :  51 BPM     Atrial Rate :  51 BPM     P-R Int : 166 ms          QRS Dur :  94 ms      QT Int : 456 ms       P-R-T Axes :  44  66  54 degrees     QTc Int : 420 ms    Sinus bradycardia  Otherwise normal ECG  When compared with ECG of 21-MAY-2020 15:59,  No significant change was found  Confirmed by " Dennis Mckinney () on 10/14/2022 12:27:29 PM    Referred By:            Confirmed By: Dennis Mckinney           ALLERGIES: Patient has no known allergies.    Medication Review:   Scheduled Medications:  B-complex with vitamin C, 2 tablet, Oral, Daily  DULoxetine, 30 mg, Oral, Daily  gabapentin, 300 mg, Oral, TID  lisinopril-hydroCHLOROthiazide (ZESTORTIC) 20-12.5 mg combo dose, , Oral, Daily  LORazepam, 0.5 mg, Oral, 3 times per day  multivitamin with minerals, 1 tablet, Oral, Daily  nicotine, 1 patch, Transdermal, Q24H  thiamine, 100 mg, Oral, Daily         PRN Medications:  •  acetaminophen  •  aluminum-magnesium hydroxide-simethicone  •  benzonatate  •  benztropine **OR** benztropine  •  famotidine  •  hydrOXYzine  •  ibuprofen  •  loperamide  •  [] LORazepam **FOLLOWED BY** [] LORazepam **FOLLOWED BY** [] LORazepam **FOLLOWED BY** LORazepam  •  magnesium hydroxide  •  ondansetron  •  sodium chloride  •  traZODone   All medications reviewed.    ASSESSMENT & PLAN:      Alcohol use disorder, severe, dependence (HCC)  -Continue Ativan detox  -Thiamine and folate  -Improving, again no major withdrawal symptoms noted today          Tetrahydrocannabinol (THC) use disorder, moderate, dependence (HCC)  -Supportive treatment, encourage cessation       Nicotine use disorder  -Encourage cessation       Anxiety  -Continue duloxetine       HTN (hypertension)  -Linsinopril/HCTZ       Back pain  -Gabapentin    Plan discharge tomorrow.    Special precautions: Special Precautions Level 4 (q30 min checks).    Behavioral Health Treatment Plan and Problem List: I have reviewed and approved the Behavioral Health Treatment Plan and Problem list.  The patient has had a chance to review and agrees with the treatment plan.     Clinician:  Cecelia Gregory MD  10/17/22  13:55 EDT

## 2022-10-17 NOTE — PLAN OF CARE
Problem: Adult Behavioral Health Plan of Care  Goal: Plan of Care Review  Outcome: Ongoing, Progressing  Flowsheets  Taken 10/17/2022 1050  Progress: improving  Plan of Care Reviewed With: patient  Patient Agreement with Plan of Care: agrees  Outcome Evaluation: Pt calm and cooperative.Pt possibly discharged tomorrow.  Taken 10/17/2022 0807  Plan of Care Reviewed With: patient  Patient Agreement with Plan of Care: agrees   Goal Outcome Evaluation:  Plan of Care Reviewed With: patient  Patient Agreement with Plan of Care: agrees     Progress: improving  Outcome Evaluation: Pt calm and cooperative.Pt possibly discharged tomorrow.

## 2022-10-18 VITALS
TEMPERATURE: 97.4 F | HEART RATE: 62 BPM | HEIGHT: 71 IN | WEIGHT: 206.4 LBS | RESPIRATION RATE: 18 BRPM | SYSTOLIC BLOOD PRESSURE: 116 MMHG | BODY MASS INDEX: 28.9 KG/M2 | DIASTOLIC BLOOD PRESSURE: 82 MMHG | OXYGEN SATURATION: 97 %

## 2022-10-18 PROCEDURE — 99238 HOSP IP/OBS DSCHRG MGMT 30/<: CPT | Performed by: PSYCHIATRY & NEUROLOGY

## 2022-10-18 RX ADMIN — Medication 100 MG: at 08:06

## 2022-10-18 RX ADMIN — HYDROCHLOROTHIAZIDE: 12.5 TABLET ORAL at 08:05

## 2022-10-18 RX ADMIN — Medication 1 TABLET: at 08:05

## 2022-10-18 RX ADMIN — GABAPENTIN 300 MG: 300 CAPSULE ORAL at 08:06

## 2022-10-18 RX ADMIN — Medication 2 TABLET: at 08:06

## 2022-10-18 RX ADMIN — DULOXETINE HYDROCHLORIDE 30 MG: 30 CAPSULE, DELAYED RELEASE ORAL at 08:05

## 2022-10-18 NOTE — PLAN OF CARE
Goal Outcome Evaluation:  Plan of Care Reviewed With: patient        Progress: improving  Outcome Evaluation: Pt calm and cooperative. Pt reports he is being discharged in the am.

## 2022-10-18 NOTE — DISCHARGE SUMMARY
":  1980  MRN:  [id number]  Visit Number:  [id number]      Date of Admission:10/13/2022   Date of Discharge:  10/18/2022    Discharge Diagnosis:  Principal Problem:    Alcohol use disorder, severe, dependence (HCC)  Active Problems:    HTN (hypertension)    Tetrahydrocannabinol (THC) use disorder, moderate, dependence (HCC)    Anxiety        Admission Diagnosis:  Alcohol abuse [F10.10]     HPI  Favio Perez is a 42 y.o. male who was admitted on 10/13/2022 with complaints of alcohol use and withdrawals.   For details please see H&P dated 10/14/22.    Hospital Course  Patient is a 42 y.o. male presented with alcohol use and withdrawals. The patient was admitted to the [facility] detox recovery unit for safety, further evaluation and treatment.  The patient was started on Ativan detox and was able to complete it without any adverse events. He was continued on his home medications.   The patient was also able to take part in individual and group counseling sessions and work on appropriate coping skills.  The patient made steady improvement in his with mood and expressed feeling more positive and hopeful about future. Sleep and appetite were improved.  The day of discharge the patient was calm, cooperative and pleasant. Mood was reported to be good, and denied SI/HI/AVH. Also reported no medication side effects.  .      Mental Status Exam upon discharge:   Mood \"good\"   Affect-congruent, appropriate, stable  Thought Content-goal directed, no delusional material present  Thought process-linear, organized.  Suicidality: No SI  Homicidality: No HI  Perception: No AH/VH    Procedures Performed         Consults:   Consults     No orders found from 2022 to 10/14/2022.          Pertinent Test Results:   Admission on 10/13/2022   Component Date Value Ref Range Status   • QT Interval 10/14/2022 456  ms Final   • QTC Interval 10/14/2022 420  ms Final   • Glucose 10/14/2022 88  65 - 99 mg/dL Final   • BUN " 10/14/2022 7  6 - 20 mg/dL Final   • Creatinine 10/14/2022 0.93  0.76 - 1.27 mg/dL Final   • Sodium 10/14/2022 137  136 - 145 mmol/L Final   • Potassium 10/14/2022 4.4  3.5 - 5.2 mmol/L Final    Slight hemolysis detected by analyzer. Results may be affected.   • Chloride 10/14/2022 101  98 - 107 mmol/L Final   • CO2 10/14/2022 26.1  22.0 - 29.0 mmol/L Final   • Calcium 10/14/2022 9.5  8.6 - 10.5 mg/dL Final   • Total Protein 10/14/2022 7.1  6.0 - 8.5 g/dL Final   • Albumin 10/14/2022 4.77  3.50 - 5.20 g/dL Final   • ALT (SGPT) 10/14/2022 24  1 - 41 U/L Final   • AST (SGOT) 10/14/2022 22  1 - 40 U/L Final   • Alkaline Phosphatase 10/14/2022 60  39 - 117 U/L Final   • Total Bilirubin 10/14/2022 2.0 (H)  0.0 - 1.2 mg/dL Final   • Globulin 10/14/2022 2.3  gm/dL Final   • A/G Ratio 10/14/2022 2.0  g/dL Final   • BUN/Creatinine Ratio 10/14/2022 7.5  7.0 - 25.0 Final   • Anion Gap 10/14/2022 9.9  5.0 - 15.0 mmol/L Final   • eGFR 10/14/2022 103.8  >60.0 mL/min/1.73 Final    National Kidney Foundation and American Society of Nephrology (ASN) Task Force recommended calculation based on the Chronic Kidney Disease Epidemiology Collaboration (CKD-EPI) equation refit without adjustment for race.   Admission on 10/13/2022, Discharged on 10/13/2022   Component Date Value Ref Range Status   • Glucose 10/13/2022 91  65 - 99 mg/dL Final   • BUN 10/13/2022 6  6 - 20 mg/dL Final   • Creatinine 10/13/2022 0.82  0.76 - 1.27 mg/dL Final   • Sodium 10/13/2022 133 (L)  136 - 145 mmol/L Final   • Potassium 10/13/2022 4.3  3.5 - 5.2 mmol/L Final   • Chloride 10/13/2022 95 (L)  98 - 107 mmol/L Final   • CO2 10/13/2022 26.6  22.0 - 29.0 mmol/L Final   • Calcium 10/13/2022 9.4  8.6 - 10.5 mg/dL Final   • Total Protein 10/13/2022 7.2  6.0 - 8.5 g/dL Final   • Albumin 10/13/2022 4.93  3.50 - 5.20 g/dL Final   • ALT (SGPT) 10/13/2022 24  1 - 41 U/L Final   • AST (SGOT) 10/13/2022 21  1 - 40 U/L Final   • Alkaline Phosphatase 10/13/2022 59  39 - 117  U/L Final   • Total Bilirubin 10/13/2022 0.9  0.0 - 1.2 mg/dL Final   • Globulin 10/13/2022 2.3  gm/dL Final   • A/G Ratio 10/13/2022 2.2  g/dL Final   • BUN/Creatinine Ratio 10/13/2022 7.3  7.0 - 25.0 Final   • Anion Gap 10/13/2022 11.4  5.0 - 15.0 mmol/L Final   • eGFR 10/13/2022 111.1  >60.0 mL/min/1.73 Final    National Kidney Foundation and American Society of Nephrology (ASN) Task Force recommended calculation based on the Chronic Kidney Disease Epidemiology Collaboration (CKD-EPI) equation refit without adjustment for race.   • Color, UA 10/13/2022 Yellow  Yellow, Straw Final   • Appearance, UA 10/13/2022 Clear  Clear Final   • pH, UA 10/13/2022 7.0  5.0 - 8.0 Final   • Specific Gravity, UA 10/13/2022 <=1.005  1.005 - 1.030 Final   • Glucose, UA 10/13/2022 Negative  Negative Final   • Ketones, UA 10/13/2022 Negative  Negative Final   • Bilirubin, UA 10/13/2022 Negative  Negative Final   • Blood, UA 10/13/2022 Negative  Negative Final   • Protein, UA 10/13/2022 Negative  Negative Final   • Leuk Esterase, UA 10/13/2022 Negative  Negative Final   • Nitrite, UA 10/13/2022 Negative  Negative Final   • Urobilinogen, UA 10/13/2022 0.2 E.U./dL  0.2 - 1.0 E.U./dL Final   • THC, Screen, Urine 10/13/2022 Positive (A)  Negative Final   • Phencyclidine (PCP), Urine 10/13/2022 Negative  Negative Final   • Cocaine Screen, Urine 10/13/2022 Negative  Negative Final   • Methamphetamine, Ur 10/13/2022 Negative  Negative Final   • Opiate Screen 10/13/2022 Negative  Negative Final   • Amphetamine Screen, Urine 10/13/2022 Negative  Negative Final   • Benzodiazepine Screen, Urine 10/13/2022 Negative  Negative Final   • Tricyclic Antidepressants Screen 10/13/2022 Negative  Negative Final   • Methadone Screen, Urine 10/13/2022 Negative  Negative Final   • Barbiturates Screen, Urine 10/13/2022 Negative  Negative Final   • Oxycodone Screen, Urine 10/13/2022 Negative  Negative Final   • Propoxyphene Screen 10/13/2022 Negative  Negative  Final   • Buprenorphine, Screen, Urine 10/13/2022 Negative  Negative Final   • Ethanol 10/13/2022 122 (H)  0 - 10 mg/dL Final   • Ethanol % 10/13/2022 0.122  % Final   • Magnesium 10/13/2022 2.3  1.6 - 2.6 mg/dL Final   • TSH 10/13/2022 1.600  0.270 - 4.200 uIU/mL Final   • WBC 10/13/2022 10.03  3.40 - 10.80 10*3/mm3 Final   • RBC 10/13/2022 5.03  4.14 - 5.80 10*6/mm3 Final   • Hemoglobin 10/13/2022 16.3  13.0 - 17.7 g/dL Final   • Hematocrit 10/13/2022 47.1  37.5 - 51.0 % Final   • MCV 10/13/2022 93.6  79.0 - 97.0 fL Final   • MCH 10/13/2022 32.4  26.6 - 33.0 pg Final   • MCHC 10/13/2022 34.6  31.5 - 35.7 g/dL Final   • RDW 10/13/2022 12.8  12.3 - 15.4 % Final   • RDW-SD 10/13/2022 44.2  37.0 - 54.0 fl Final   • MPV 10/13/2022 9.8  6.0 - 12.0 fL Final   • Platelets 10/13/2022 230  140 - 450 10*3/mm3 Final   • Neutrophil % 10/13/2022 71.6  42.7 - 76.0 % Final   • Lymphocyte % 10/13/2022 17.0 (L)  19.6 - 45.3 % Final   • Monocyte % 10/13/2022 9.3  5.0 - 12.0 % Final   • Eosinophil % 10/13/2022 1.5  0.3 - 6.2 % Final   • Basophil % 10/13/2022 0.3  0.0 - 1.5 % Final   • Immature Grans % 10/13/2022 0.3  0.0 - 0.5 % Final   • Neutrophils, Absolute 10/13/2022 7.18 (H)  1.70 - 7.00 10*3/mm3 Final   • Lymphocytes, Absolute 10/13/2022 1.71  0.70 - 3.10 10*3/mm3 Final   • Monocytes, Absolute 10/13/2022 0.93 (H)  0.10 - 0.90 10*3/mm3 Final   • Eosinophils, Absolute 10/13/2022 0.15  0.00 - 0.40 10*3/mm3 Final   • Basophils, Absolute 10/13/2022 0.03  0.00 - 0.20 10*3/mm3 Final   • Immature Grans, Absolute 10/13/2022 0.03  0.00 - 0.05 10*3/mm3 Final   • nRBC 10/13/2022 0.0  0.0 - 0.2 /100 WBC Final   • Extra Tube 10/13/2022 Hold for add-ons.   Final    Auto resulted.   • Extra Tube 10/13/2022 hold for add-on   Final    Auto resulted   • Extra Tube 10/13/2022 Hold for add-ons.   Final    Auto resulted.   • Extra Tube 10/13/2022 Hold for add-ons.   Final    Auto resulted   • COVID19 10/13/2022 Not Detected  Not Detected - Ref.  Range Final   • Influenza A PCR 10/13/2022 Not Detected  Not Detected Final   • Influenza B PCR 10/13/2022 Not Detected  Not Detected Final   • Ethanol 10/13/2022 98 (H)  0 - 10 mg/dL Final   • Ethanol % 10/13/2022 0.098  % Final        Condition on Discharge:  improved    Vital Signs  Temp:  [97.4 °F (36.3 °C)-98.7 °F (37.1 °C)] 97.4 °F (36.3 °C)  Heart Rate:  [57-90] 62  Resp:  [16-18] 18  BP: ()/(62-82) 116/82      Discharge Disposition:  Home or Self Care    Discharge Medications:     Discharge Medications      Continue These Medications      Instructions Start Date   DULoxetine 30 MG capsule  Commonly known as: CYMBALTA   30 mg, Oral, Daily      gabapentin 300 MG capsule  Commonly known as: NEURONTIN   300 mg, Oral, 3 Times Daily      lisinopril-hydrochlorothiazide 20-12.5 MG per tablet  Commonly known as: PRINZIDE,ZESTORETIC   1 tablet, Oral, Daily             Discharge Diet: Regular     Activity at Discharge: As tolerated     Follow-up Appointments      [facility]  [address], [place] [address] [phone] - phone   [phone] - fax      October 26 2022 at 9:00am  Please arrive 15  minutes early. Bring photo id and insurance card.        Time spent in discharge: < 30 min    Clinician:   Cecelia Gregory MD  10/18/22  11:01 EDT

## 2022-10-18 NOTE — PLAN OF CARE
Problem: Adult Behavioral Health Plan of Care  Goal: Plan of Care Review  Outcome: Adequate for Care Transition  Flowsheets (Taken 10/18/2022 1036)  Progress: improving  Plan of Care Reviewed With: patient  Patient Agreement with Plan of Care: agrees  Outcome Evaluation: Pt being discharged today.   Goal Outcome Evaluation:  Plan of Care Reviewed With: patient  Patient Agreement with Plan of Care: agrees     Progress: improving  Outcome Evaluation: Pt being discharged today.

## 2022-10-18 NOTE — CASE MANAGEMENT/SOCIAL WORK
..Bridge Session  Date: 10/18/2022   Time: 1000    Data:  Reason for Inpatient Admission:Alcohol abuse    Follow up: [facility] [address], [place], [place] [address] [phone] - phone   [phone] - fax     October 26 2022 at 9:00am  Please arrive 15  minutes early. Bring photo id and insurance card.       Coping Skills to Utilize: Patient encouraged to engage in physical activity, negative thought redirection, mindfulness and engaging support system    Crisis Safety Plan:  • Support System to utilize and contact numbers: patients girlfriend and patient has contact number    • Educated on crisis hotline numbers (yes/no): Yes    • Was the Patient made aware of contact information for the following: community mental health centers, crisis stabilization programs, residential programs, , etc (yes/no): Yes    Will transportation be a barrier (yes/no): No  • If so, explain solution(s) to resolve barrier: n/a    • How and where will the patient obtain prescribed medications: Patient had not medications following stabilization and reports no issues with obtaining medications.    Assessment: Patient completed medical detox.  He denies suicidal ideation and denies homicidal ideation.  Patient reports decreased anxiety and depression.  Patient reports feeling better and ready for discharge today.    Plan:    Discussed the importance of follow up treatment for continuity of care. The Patient was able to verbalize understanding and commitment to the individualized aftercare and crisis safety plan.      Bailey Amado [facility]

## 2022-10-23 NOTE — ED PROVIDER NOTES
Subjective   History of Present Illness  Patient is a 42-year-old male with significant past medical history positive for diverticulitis, EtOH abuse, hypertension presenting to the ER for alcohol detox.  Patient reports he would like to detox from alcohol.  Patient reports his last drink was this morning, pt states he drinks 12 16oz beers daily and has been at this rate for the last year. Patient denies any HI, SI, or AVH. Patient reports poor sleep, appetite is okay. Patient CIWA-9 at this time. Denies any other substance abuse besides alcohol and marijuana at this time. Smokes about a joint daily of marijuana for anxiety, last use today and has used for years. Denies any additional symptoms.     History provided by:  Patient   used: No        Review of Systems   Constitutional: Negative.  Negative for fever.   HENT: Negative.    Respiratory: Negative.    Cardiovascular: Negative.  Negative for chest pain.   Gastrointestinal: Negative.  Negative for abdominal pain.   Endocrine: Negative.    Genitourinary: Negative.  Negative for dysuria.   Skin: Negative.    Neurological: Negative.    Psychiatric/Behavioral: Negative.    All other systems reviewed and are negative.      Past Medical History:   Diagnosis Date   • Diverticulitis    • ETOH abuse    • HTN (hypertension)    • Hypertension    • Low back pain        No Known Allergies    Past Surgical History:   Procedure Laterality Date   • COLON RESECTION N/A 11/10/2016    Procedure: COLON RESECTION LAPAROSCOPIC ;  Surgeon: Miky Antoine MD;  Location: [facility] MAIN OR;  Service:    • COLONOSCOPY N/A 11/9/2016    Procedure: COLONOSCOPY;  Surgeon: Miky Antoine MD;  Location: [facility] ENDOSCOPY;  Service:    • FRACTURE SURGERY         Family History   Problem Relation Age of Onset   • Hypertension Brother    • Diabetes Maternal Grandmother    • Arthritis Mother    • Hypertension Mother    • Arthritis Father    • Cancer Father    • Heart disease  Father    • Heart disease Paternal Grandfather    • Hypertension Paternal Grandfather        Social History     Socioeconomic History   • Marital status:    Tobacco Use   • Smoking status: Every Day     Packs/day: 1.00     Years: 20.00     Pack years: 20.00     Types: Cigarettes   • Smokeless tobacco: Current     Types: Snuff   • Tobacco comments:     dips and smokes daily.    Vaping Use   • Vaping Use: Never used   Substance and Sexual Activity   • Alcohol use: Yes     Alcohol/week: 12.0 standard drinks     Types: 12 Cans of beer per week     Comment: 12 beers daily   • Drug use: Yes     Types: Marijuana, Other     Comment: etoh   • Sexual activity: Yes     Partners: Female     Birth control/protection: None           Objective   Physical Exam  Vitals and nursing note reviewed.   Constitutional:       General: He is not in acute distress.     Appearance: He is well-developed. He is not diaphoretic.   HENT:      Head: Normocephalic and atraumatic.      Right Ear: External ear normal.      Left Ear: External ear normal.      Nose: Nose normal.   Eyes:      Conjunctiva/sclera: Conjunctivae normal.      Pupils: Pupils are equal, round, and reactive to light.   Neck:      Vascular: No JVD.      Trachea: No tracheal deviation.   Cardiovascular:      Rate and Rhythm: Normal rate and regular rhythm.      Heart sounds: Normal heart sounds. No murmur heard.  Pulmonary:      Effort: Pulmonary effort is normal. No respiratory distress.      Breath sounds: Normal breath sounds. No wheezing.   Abdominal:      General: Bowel sounds are normal.      Palpations: Abdomen is soft.      Tenderness: There is no abdominal tenderness.   Musculoskeletal:         General: No deformity. Normal range of motion.      Cervical back: Normal range of motion and neck supple.   Skin:     General: Skin is warm and dry.      Coloration: Skin is not pale.      Findings: No erythema or rash.   Neurological:      Mental Status: He is alert and  oriented to person, place, and time.      Cranial Nerves: No cranial nerve deficit.   Psychiatric:         Mood and Affect: Mood normal.         Behavior: Behavior normal.         Thought Content: Thought content normal. Thought content does not include homicidal or suicidal ideation. Thought content does not include homicidal or suicidal plan.         Judgment: Judgment normal.         Procedures           ED Course  ED Course as of 10/23/22 0144   Thu Oct 13, 2022   1500 Cleared for intake []      ED Course User Index  [] Cookie Lui, MICA                                           [facility]    Final diagnoses:   Alcohol abuse       ED Disposition  ED Disposition     ED Disposition   DC/Transfer to Behavioral Health    Condition   Stable    Comment   --             No follow-up provider specified.       Medication List      No changes were made to your prescriptions during this visit.          Cookie Lui, APRN  10/23/22 0144

## 2023-07-20 ENCOUNTER — HOSPITAL ENCOUNTER (EMERGENCY)
Facility: HOSPITAL | Age: 43
Discharge: PSYCHIATRIC OR SUBSTANCE USE FACILITY | DRG: 897 | End: 2023-07-20
Attending: EMERGENCY MEDICINE | Admitting: EMERGENCY MEDICINE
Payer: COMMERCIAL

## 2023-07-20 ENCOUNTER — APPOINTMENT (OUTPATIENT)
Dept: CT IMAGING | Facility: HOSPITAL | Age: 43
DRG: 897 | End: 2023-07-20
Payer: COMMERCIAL

## 2023-07-20 ENCOUNTER — HOSPITAL ENCOUNTER (INPATIENT)
Facility: HOSPITAL | Age: 43
LOS: 3 days | Discharge: HOME | DRG: 897 | End: 2023-07-23
Attending: PSYCHIATRY & NEUROLOGY | Admitting: PSYCHIATRY & NEUROLOGY
Payer: COMMERCIAL

## 2023-07-20 VITALS
HEIGHT: 71 IN | OXYGEN SATURATION: 98 % | HEART RATE: 65 BPM | RESPIRATION RATE: 18 BRPM | TEMPERATURE: 97.2 F | BODY MASS INDEX: 32.2 KG/M2 | WEIGHT: 230 LBS | DIASTOLIC BLOOD PRESSURE: 88 MMHG | SYSTOLIC BLOOD PRESSURE: 136 MMHG

## 2023-07-20 DIAGNOSIS — M54.40 ACUTE RIGHT-SIDED LOW BACK PAIN WITH SCIATICA, SCIATICA LATERALITY UNSPECIFIED: ICD-10-CM

## 2023-07-20 DIAGNOSIS — F10.90 ALCOHOL PROBLEM DRINKING: Primary | ICD-10-CM

## 2023-07-20 LAB
ALBUMIN SERPL-MCNC: 4.5 G/DL (ref 3.5–5.2)
ALBUMIN/GLOB SERPL: 1.7 G/DL
ALP SERPL-CCNC: 58 U/L (ref 39–117)
ALT SERPL W P-5'-P-CCNC: 25 U/L (ref 1–41)
AMPHET+METHAMPHET UR QL: NEGATIVE
AMPHETAMINES UR QL: NEGATIVE
ANION GAP SERPL CALCULATED.3IONS-SCNC: 12.1 MMOL/L (ref 5–15)
APAP SERPL-MCNC: <5 MCG/ML (ref 0–30)
AST SERPL-CCNC: 21 U/L (ref 1–40)
BARBITURATES UR QL SCN: NEGATIVE
BASOPHILS # BLD AUTO: 0.04 10*3/MM3 (ref 0–0.2)
BASOPHILS NFR BLD AUTO: 0.4 % (ref 0–1.5)
BENZODIAZ UR QL SCN: NEGATIVE
BILIRUB SERPL-MCNC: 0.9 MG/DL (ref 0–1.2)
BILIRUB UR QL STRIP: NEGATIVE
BUN SERPL-MCNC: 7 MG/DL (ref 6–20)
BUN/CREAT SERPL: 8.3 (ref 7–25)
BUPRENORPHINE SERPL-MCNC: NEGATIVE NG/ML
CALCIUM SPEC-SCNC: 9.4 MG/DL (ref 8.6–10.5)
CANNABINOIDS SERPL QL: POSITIVE
CHLORIDE SERPL-SCNC: 96 MMOL/L (ref 98–107)
CLARITY UR: CLEAR
CO2 SERPL-SCNC: 25.9 MMOL/L (ref 22–29)
COCAINE UR QL: NEGATIVE
COLOR UR: YELLOW
CREAT SERPL-MCNC: 0.84 MG/DL (ref 0.76–1.27)
CRP SERPL-MCNC: 0.89 MG/DL (ref 0–0.5)
DEPRECATED RDW RBC AUTO: 44.6 FL (ref 37–54)
EGFRCR SERPLBLD CKD-EPI 2021: 109.6 ML/MIN/1.73
EOSINOPHIL # BLD AUTO: 0.2 10*3/MM3 (ref 0–0.4)
EOSINOPHIL NFR BLD AUTO: 1.8 % (ref 0.3–6.2)
ERYTHROCYTE [DISTWIDTH] IN BLOOD BY AUTOMATED COUNT: 13 % (ref 12.3–15.4)
ERYTHROCYTE [SEDIMENTATION RATE] IN BLOOD: 1 MM/HR (ref 0–15)
ETHANOL BLD-MCNC: 93 MG/DL (ref 0–10)
ETHANOL UR QL: 0.09 %
FENTANYL UR-MCNC: NEGATIVE NG/ML
FLUAV RNA RESP QL NAA+PROBE: NOT DETECTED
FLUBV RNA RESP QL NAA+PROBE: NOT DETECTED
GLOBULIN UR ELPH-MCNC: 2.6 GM/DL
GLUCOSE SERPL-MCNC: 118 MG/DL (ref 65–99)
GLUCOSE UR STRIP-MCNC: NEGATIVE MG/DL
HCT VFR BLD AUTO: 42.9 % (ref 37.5–51)
HGB BLD-MCNC: 14.4 G/DL (ref 13–17.7)
HGB UR QL STRIP.AUTO: NEGATIVE
HOLD SPECIMEN: NORMAL
HOLD SPECIMEN: NORMAL
IMM GRANULOCYTES # BLD AUTO: 0.07 10*3/MM3 (ref 0–0.05)
IMM GRANULOCYTES NFR BLD AUTO: 0.6 % (ref 0–0.5)
KETONES UR QL STRIP: NEGATIVE
LEUKOCYTE ESTERASE UR QL STRIP.AUTO: NEGATIVE
LYMPHOCYTES # BLD AUTO: 1.63 10*3/MM3 (ref 0.7–3.1)
LYMPHOCYTES NFR BLD AUTO: 14.8 % (ref 19.6–45.3)
MAGNESIUM SERPL-MCNC: 2.1 MG/DL (ref 1.6–2.6)
MCH RBC QN AUTO: 31 PG (ref 26.6–33)
MCHC RBC AUTO-ENTMCNC: 33.6 G/DL (ref 31.5–35.7)
MCV RBC AUTO: 92.5 FL (ref 79–97)
METHADONE UR QL SCN: NEGATIVE
MONOCYTES # BLD AUTO: 1.21 10*3/MM3 (ref 0.1–0.9)
MONOCYTES NFR BLD AUTO: 11 % (ref 5–12)
NEUTROPHILS NFR BLD AUTO: 7.84 10*3/MM3 (ref 1.7–7)
NEUTROPHILS NFR BLD AUTO: 71.4 % (ref 42.7–76)
NITRITE UR QL STRIP: NEGATIVE
NRBC BLD AUTO-RTO: 0 /100 WBC (ref 0–0.2)
OPIATES UR QL: NEGATIVE
OXYCODONE UR QL SCN: NEGATIVE
PCP UR QL SCN: NEGATIVE
PH UR STRIP.AUTO: 6.5 [PH] (ref 5–8)
PLATELET # BLD AUTO: 224 10*3/MM3 (ref 140–450)
PMV BLD AUTO: 10.1 FL (ref 6–12)
POTASSIUM SERPL-SCNC: 3.8 MMOL/L (ref 3.5–5.2)
PROPOXYPH UR QL: NEGATIVE
PROT SERPL-MCNC: 7.1 G/DL (ref 6–8.5)
PROT UR QL STRIP: NEGATIVE
RBC # BLD AUTO: 4.64 10*6/MM3 (ref 4.14–5.8)
SALICYLATES SERPL-MCNC: <0.3 MG/DL
SARS-COV-2 RNA RESP QL NAA+PROBE: NOT DETECTED
SODIUM SERPL-SCNC: 134 MMOL/L (ref 136–145)
SP GR UR STRIP: <=1.005 (ref 1–1.03)
TRICYCLICS UR QL SCN: NEGATIVE
UROBILINOGEN UR QL STRIP: NORMAL
WBC NRBC COR # BLD: 10.99 10*3/MM3 (ref 3.4–10.8)
WHOLE BLOOD HOLD COAG: NORMAL
WHOLE BLOOD HOLD SPECIMEN: NORMAL

## 2023-07-20 PROCEDURE — 25010000002 DEXAMETHASONE SODIUM PHOSPHATE 10 MG/ML SOLUTION: Performed by: NURSE PRACTITIONER

## 2023-07-20 PROCEDURE — 80179 DRUG ASSAY SALICYLATE: CPT | Performed by: NURSE PRACTITIONER

## 2023-07-20 PROCEDURE — 87636 SARSCOV2 & INF A&B AMP PRB: CPT | Performed by: NURSE PRACTITIONER

## 2023-07-20 PROCEDURE — 81003 URINALYSIS AUTO W/O SCOPE: CPT | Performed by: NURSE PRACTITIONER

## 2023-07-20 PROCEDURE — 96361 HYDRATE IV INFUSION ADD-ON: CPT

## 2023-07-20 PROCEDURE — 80307 DRUG TEST PRSMV CHEM ANLYZR: CPT | Performed by: NURSE PRACTITIONER

## 2023-07-20 PROCEDURE — 96365 THER/PROPH/DIAG IV INF INIT: CPT

## 2023-07-20 PROCEDURE — 80053 COMPREHEN METABOLIC PANEL: CPT | Performed by: NURSE PRACTITIONER

## 2023-07-20 PROCEDURE — 99285 EMERGENCY DEPT VISIT HI MDM: CPT

## 2023-07-20 PROCEDURE — 82077 ASSAY SPEC XCP UR&BREATH IA: CPT | Performed by: NURSE PRACTITIONER

## 2023-07-20 PROCEDURE — 85025 COMPLETE CBC W/AUTO DIFF WBC: CPT | Performed by: NURSE PRACTITIONER

## 2023-07-20 PROCEDURE — 80143 DRUG ASSAY ACETAMINOPHEN: CPT | Performed by: NURSE PRACTITIONER

## 2023-07-20 PROCEDURE — 85652 RBC SED RATE AUTOMATED: CPT | Performed by: NURSE PRACTITIONER

## 2023-07-20 PROCEDURE — 25010000002 ORPHENADRINE CITRATE PER 60 MG: Performed by: NURSE PRACTITIONER

## 2023-07-20 PROCEDURE — 72131 CT LUMBAR SPINE W/O DYE: CPT | Performed by: RADIOLOGY

## 2023-07-20 PROCEDURE — 93005 ELECTROCARDIOGRAM TRACING: CPT | Performed by: PSYCHIATRY & NEUROLOGY

## 2023-07-20 PROCEDURE — 25010000002 THIAMINE PER 100 MG: Performed by: NURSE PRACTITIONER

## 2023-07-20 PROCEDURE — 96375 TX/PRO/DX INJ NEW DRUG ADDON: CPT

## 2023-07-20 PROCEDURE — 25010000002 KETOROLAC TROMETHAMINE PER 15 MG: Performed by: NURSE PRACTITIONER

## 2023-07-20 PROCEDURE — 93010 ELECTROCARDIOGRAM REPORT: CPT | Performed by: INTERNAL MEDICINE

## 2023-07-20 PROCEDURE — 83735 ASSAY OF MAGNESIUM: CPT | Performed by: NURSE PRACTITIONER

## 2023-07-20 PROCEDURE — 72131 CT LUMBAR SPINE W/O DYE: CPT

## 2023-07-20 PROCEDURE — 86140 C-REACTIVE PROTEIN: CPT | Performed by: NURSE PRACTITIONER

## 2023-07-20 RX ORDER — HYDROXYZINE HYDROCHLORIDE 25 MG/1
25 TABLET, FILM COATED ORAL 3 TIMES DAILY PRN
Status: CANCELLED | OUTPATIENT
Start: 2023-07-20

## 2023-07-20 RX ORDER — LORAZEPAM 2 MG/1
2 TABLET ORAL
Status: COMPLETED | OUTPATIENT
Start: 2023-07-21 | End: 2023-07-21

## 2023-07-20 RX ORDER — LORAZEPAM 0.5 MG/1
0.5 TABLET ORAL
Status: DISCONTINUED | OUTPATIENT
Start: 2023-07-24 | End: 2023-07-23 | Stop reason: HOSPADM

## 2023-07-20 RX ORDER — MULTIPLE VITAMINS W/ MINERALS TAB 9MG-400MCG
1 TAB ORAL DAILY
Status: DISCONTINUED | OUTPATIENT
Start: 2023-07-20 | End: 2023-07-23 | Stop reason: HOSPADM

## 2023-07-20 RX ORDER — DEXAMETHASONE SODIUM PHOSPHATE 10 MG/ML
10 INJECTION, SOLUTION INTRAMUSCULAR; INTRAVENOUS ONCE
Status: COMPLETED | OUTPATIENT
Start: 2023-07-20 | End: 2023-07-20

## 2023-07-20 RX ORDER — BENZTROPINE MESYLATE 1 MG/ML
1 INJECTION INTRAMUSCULAR; INTRAVENOUS ONCE AS NEEDED
Status: DISCONTINUED | OUTPATIENT
Start: 2023-07-20 | End: 2023-07-23 | Stop reason: HOSPADM

## 2023-07-20 RX ORDER — LISINOPRIL 10 MG/1
20 TABLET ORAL
Status: DISCONTINUED | OUTPATIENT
Start: 2023-07-20 | End: 2023-07-23 | Stop reason: HOSPADM

## 2023-07-20 RX ORDER — THIAMINE HYDROCHLORIDE 100 MG/ML
200 INJECTION, SOLUTION INTRAMUSCULAR; INTRAVENOUS ONCE
Status: COMPLETED | OUTPATIENT
Start: 2023-07-20 | End: 2023-07-20

## 2023-07-20 RX ORDER — ERGOCALCIFEROL 1.25 MG/1
50000 CAPSULE ORAL WEEKLY
COMMUNITY

## 2023-07-20 RX ORDER — ORPHENADRINE CITRATE 30 MG/ML
60 INJECTION INTRAMUSCULAR; INTRAVENOUS ONCE
Status: COMPLETED | OUTPATIENT
Start: 2023-07-20 | End: 2023-07-20

## 2023-07-20 RX ORDER — SODIUM CHLORIDE 0.9 % (FLUSH) 0.9 %
10 SYRINGE (ML) INJECTION AS NEEDED
Status: DISCONTINUED | OUTPATIENT
Start: 2023-07-20 | End: 2023-07-20 | Stop reason: HOSPADM

## 2023-07-20 RX ORDER — ORPHENADRINE CITRATE 100 MG/1
100 TABLET, EXTENDED RELEASE ORAL 2 TIMES DAILY
Status: DISCONTINUED | OUTPATIENT
Start: 2023-07-20 | End: 2023-07-23 | Stop reason: HOSPADM

## 2023-07-20 RX ORDER — LOPERAMIDE HYDROCHLORIDE 2 MG/1
2 CAPSULE ORAL
Status: DISCONTINUED | OUTPATIENT
Start: 2023-07-20 | End: 2023-07-23 | Stop reason: HOSPADM

## 2023-07-20 RX ORDER — IBUPROFEN 400 MG/1
400 TABLET ORAL EVERY 6 HOURS PRN
Status: DISCONTINUED | OUTPATIENT
Start: 2023-07-20 | End: 2023-07-23 | Stop reason: HOSPADM

## 2023-07-20 RX ORDER — LORAZEPAM 1 MG/1
1 TABLET ORAL
Status: DISCONTINUED | OUTPATIENT
Start: 2023-07-23 | End: 2023-07-23 | Stop reason: HOSPADM

## 2023-07-20 RX ORDER — SODIUM CHLORIDE 9 MG/ML
1000 INJECTION, SOLUTION INTRAVENOUS ONCE
Status: COMPLETED | OUTPATIENT
Start: 2023-07-20 | End: 2023-07-20

## 2023-07-20 RX ORDER — ORPHENADRINE CITRATE 100 MG/1
100 TABLET, EXTENDED RELEASE ORAL 2 TIMES DAILY
Qty: 10 TABLET | Refills: 0 | Status: SHIPPED | OUTPATIENT
Start: 2023-07-20 | End: 2023-07-25

## 2023-07-20 RX ORDER — ECHINACEA PURPUREA EXTRACT 125 MG
2 TABLET ORAL AS NEEDED
Status: DISCONTINUED | OUTPATIENT
Start: 2023-07-20 | End: 2023-07-23 | Stop reason: HOSPADM

## 2023-07-20 RX ORDER — HYDROXYZINE HYDROCHLORIDE 25 MG/1
25 TABLET, FILM COATED ORAL 3 TIMES DAILY PRN
COMMUNITY
End: 2023-07-23 | Stop reason: HOSPADM

## 2023-07-20 RX ORDER — TRAZODONE HYDROCHLORIDE 50 MG/1
50 TABLET ORAL NIGHTLY PRN
Status: DISCONTINUED | OUTPATIENT
Start: 2023-07-20 | End: 2023-07-23 | Stop reason: HOSPADM

## 2023-07-20 RX ORDER — ACETAMINOPHEN 325 MG/1
650 TABLET ORAL EVERY 6 HOURS PRN
Status: DISCONTINUED | OUTPATIENT
Start: 2023-07-20 | End: 2023-07-23 | Stop reason: HOSPADM

## 2023-07-20 RX ORDER — HYDROXYZINE 50 MG/1
50 TABLET, FILM COATED ORAL EVERY 6 HOURS PRN
Status: DISCONTINUED | OUTPATIENT
Start: 2023-07-20 | End: 2023-07-23 | Stop reason: HOSPADM

## 2023-07-20 RX ORDER — MULTIVITAMIN WITH IRON
2 TABLET ORAL DAILY
Status: DISCONTINUED | OUTPATIENT
Start: 2023-07-20 | End: 2023-07-23 | Stop reason: HOSPADM

## 2023-07-20 RX ORDER — DULOXETIN HYDROCHLORIDE 30 MG/1
30 CAPSULE, DELAYED RELEASE ORAL DAILY
Status: DISCONTINUED | OUTPATIENT
Start: 2023-07-20 | End: 2023-07-23 | Stop reason: HOSPADM

## 2023-07-20 RX ORDER — LORAZEPAM 2 MG/1
2 TABLET ORAL
Status: DISPENSED | OUTPATIENT
Start: 2023-07-20 | End: 2023-07-21

## 2023-07-20 RX ORDER — LORAZEPAM 0.5 MG/1
0.5 TABLET ORAL EVERY 4 HOURS PRN
Status: DISCONTINUED | OUTPATIENT
Start: 2023-07-24 | End: 2023-07-23 | Stop reason: HOSPADM

## 2023-07-20 RX ORDER — ALUMINA, MAGNESIA, AND SIMETHICONE 2400; 2400; 240 MG/30ML; MG/30ML; MG/30ML
15 SUSPENSION ORAL EVERY 6 HOURS PRN
Status: DISCONTINUED | OUTPATIENT
Start: 2023-07-20 | End: 2023-07-23 | Stop reason: HOSPADM

## 2023-07-20 RX ORDER — ONDANSETRON 4 MG/1
4 TABLET, FILM COATED ORAL EVERY 6 HOURS PRN
Status: DISCONTINUED | OUTPATIENT
Start: 2023-07-20 | End: 2023-07-23 | Stop reason: HOSPADM

## 2023-07-20 RX ORDER — BENZTROPINE MESYLATE 1 MG/1
2 TABLET ORAL ONCE AS NEEDED
Status: DISCONTINUED | OUTPATIENT
Start: 2023-07-20 | End: 2023-07-23 | Stop reason: HOSPADM

## 2023-07-20 RX ORDER — BENZONATATE 100 MG/1
100 CAPSULE ORAL 3 TIMES DAILY PRN
Status: DISCONTINUED | OUTPATIENT
Start: 2023-07-20 | End: 2023-07-23 | Stop reason: HOSPADM

## 2023-07-20 RX ORDER — LORAZEPAM 2 MG/1
2 TABLET ORAL EVERY 4 HOURS PRN
Status: ACTIVE | OUTPATIENT
Start: 2023-07-21 | End: 2023-07-22

## 2023-07-20 RX ORDER — KETOROLAC TROMETHAMINE 10 MG/1
10 TABLET, FILM COATED ORAL EVERY 6 HOURS PRN
Qty: 20 TABLET | Refills: 0 | Status: SHIPPED | OUTPATIENT
Start: 2023-07-20 | End: 2023-07-23 | Stop reason: HOSPADM

## 2023-07-20 RX ORDER — LORAZEPAM 1 MG/1
1 TABLET ORAL EVERY 4 HOURS PRN
Status: DISCONTINUED | OUTPATIENT
Start: 2023-07-23 | End: 2023-07-23 | Stop reason: HOSPADM

## 2023-07-20 RX ORDER — KETOROLAC TROMETHAMINE 30 MG/ML
30 INJECTION, SOLUTION INTRAMUSCULAR; INTRAVENOUS ONCE
Status: COMPLETED | OUTPATIENT
Start: 2023-07-20 | End: 2023-07-20

## 2023-07-20 RX ORDER — KETOROLAC TROMETHAMINE 10 MG/1
10 TABLET, FILM COATED ORAL EVERY 6 HOURS PRN
Status: CANCELLED | OUTPATIENT
Start: 2023-07-20 | End: 2023-07-25

## 2023-07-20 RX ORDER — FAMOTIDINE 20 MG/1
20 TABLET, FILM COATED ORAL 2 TIMES DAILY PRN
Status: DISCONTINUED | OUTPATIENT
Start: 2023-07-20 | End: 2023-07-23 | Stop reason: HOSPADM

## 2023-07-20 RX ORDER — HYDROCHLOROTHIAZIDE 25 MG/1
12.5 TABLET ORAL
Status: DISCONTINUED | OUTPATIENT
Start: 2023-07-20 | End: 2023-07-23 | Stop reason: HOSPADM

## 2023-07-20 RX ADMIN — DULOXETINE HYDROCHLORIDE 30 MG: 30 CAPSULE, DELAYED RELEASE ORAL at 21:06

## 2023-07-20 RX ADMIN — OFLOXACIN 50000 UNITS: 300 TABLET, COATED ORAL at 21:06

## 2023-07-20 RX ADMIN — THIAMINE HYDROCHLORIDE 200 MG: 100 INJECTION, SOLUTION INTRAMUSCULAR; INTRAVENOUS at 14:18

## 2023-07-20 RX ADMIN — HYDROCHLOROTHIAZIDE 12.5 MG: 25 TABLET ORAL at 21:06

## 2023-07-20 RX ADMIN — FOLIC ACID 1 MG: 5 INJECTION, SOLUTION INTRAMUSCULAR; INTRAVENOUS; SUBCUTANEOUS at 13:20

## 2023-07-20 RX ADMIN — Medication 2 TABLET: at 16:15

## 2023-07-20 RX ADMIN — ORPHENADRINE CITRATE 100 MG: 100 TABLET, EXTENDED RELEASE ORAL at 21:06

## 2023-07-20 RX ADMIN — Medication 1 TABLET: at 16:15

## 2023-07-20 RX ADMIN — TRAZODONE HYDROCHLORIDE 50 MG: 50 TABLET ORAL at 21:07

## 2023-07-20 RX ADMIN — DEXAMETHASONE SODIUM PHOSPHATE 10 MG: 10 INJECTION INTRAMUSCULAR; INTRAVENOUS at 12:42

## 2023-07-20 RX ADMIN — LORAZEPAM 2 MG: 2 TABLET ORAL at 22:45

## 2023-07-20 RX ADMIN — HYDROXYZINE HYDROCHLORIDE 50 MG: 50 TABLET ORAL at 16:15

## 2023-07-20 RX ADMIN — SODIUM CHLORIDE 1000 ML: 9 INJECTION, SOLUTION INTRAVENOUS at 12:42

## 2023-07-20 RX ADMIN — KETOROLAC TROMETHAMINE 30 MG: 30 INJECTION, SOLUTION INTRAMUSCULAR; INTRAVENOUS at 12:42

## 2023-07-20 RX ADMIN — LISINOPRIL 20 MG: 10 TABLET ORAL at 21:06

## 2023-07-20 RX ADMIN — Medication 100 MG: at 16:15

## 2023-07-20 RX ADMIN — ORPHENADRINE CITRATE 60 MG: 60 INJECTION INTRAMUSCULAR; INTRAVENOUS at 12:42

## 2023-07-20 NOTE — NURSING NOTE
Patient to intake. Safety checks initiated.  pockets emptied and search completed with two staff members present. Items logged and placed in cabinet in intake area.

## 2023-07-20 NOTE — NURSING NOTE
Patient presents to intake and states that he would like to detox off of alcohol and marijuana. He reports that he had started drinking again about 6 months ago due to having ongoing issues with back pain and being unable to get the help or meds he needed for the pain. He states that currently he is drinking about 10 of the 16oz cans of beer a day. And also smoking pot daily because he has trouble sleeping. He states that he needs to detox and get help with his medications. He states that he could not get his Neurontin like he needed so he had to Wien himself off by himself. He states that is when his drinking increased. CIWA 7 at this time. Patient did receive medication in ED for back pain prior to coming to intake. Pt denies use of any other kind of drugs. Denies si hi or avh.
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
## 2023-07-20 NOTE — NURSING NOTE
Called and spoke to Dr. Gregory. Intake information and all labs and VS discussed. Instructed to admit with ativan detox. Ok to repeat CMP in the am. Admission orders received. [id number]
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
## 2023-07-21 LAB
ALBUMIN SERPL-MCNC: 4.4 G/DL (ref 3.5–5.2)
ALBUMIN/GLOB SERPL: 1.8 G/DL
ALP SERPL-CCNC: 58 U/L (ref 39–117)
ALT SERPL W P-5'-P-CCNC: 22 U/L (ref 1–41)
ANION GAP SERPL CALCULATED.3IONS-SCNC: 11.4 MMOL/L (ref 5–15)
AST SERPL-CCNC: 17 U/L (ref 1–40)
BILIRUB SERPL-MCNC: 0.9 MG/DL (ref 0–1.2)
BUN SERPL-MCNC: 12 MG/DL (ref 6–20)
BUN/CREAT SERPL: 13.8 (ref 7–25)
CALCIUM SPEC-SCNC: 9.8 MG/DL (ref 8.6–10.5)
CHLORIDE SERPL-SCNC: 101 MMOL/L (ref 98–107)
CO2 SERPL-SCNC: 22.6 MMOL/L (ref 22–29)
CREAT SERPL-MCNC: 0.87 MG/DL (ref 0.76–1.27)
EGFRCR SERPLBLD CKD-EPI 2021: 108.4 ML/MIN/1.73
GLOBULIN UR ELPH-MCNC: 2.5 GM/DL
GLUCOSE SERPL-MCNC: 127 MG/DL (ref 65–99)
POTASSIUM SERPL-SCNC: 4.5 MMOL/L (ref 3.5–5.2)
PROT SERPL-MCNC: 6.9 G/DL (ref 6–8.5)
QT INTERVAL: 408 MS
QTC INTERVAL: 437 MS
SODIUM SERPL-SCNC: 135 MMOL/L (ref 136–145)

## 2023-07-21 PROCEDURE — 80053 COMPREHEN METABOLIC PANEL: CPT | Performed by: PSYCHIATRY & NEUROLOGY

## 2023-07-21 PROCEDURE — 99223 1ST HOSP IP/OBS HIGH 75: CPT | Performed by: PSYCHIATRY & NEUROLOGY

## 2023-07-21 RX ADMIN — Medication 1 TABLET: at 08:18

## 2023-07-21 RX ADMIN — HYDROCHLOROTHIAZIDE 12.5 MG: 25 TABLET ORAL at 08:18

## 2023-07-21 RX ADMIN — IBUPROFEN 400 MG: 400 TABLET, FILM COATED ORAL at 21:09

## 2023-07-21 RX ADMIN — HYDROXYZINE HYDROCHLORIDE 50 MG: 50 TABLET ORAL at 21:09

## 2023-07-21 RX ADMIN — Medication 100 MG: at 08:18

## 2023-07-21 RX ADMIN — DULOXETINE HYDROCHLORIDE 30 MG: 30 CAPSULE, DELAYED RELEASE ORAL at 08:18

## 2023-07-21 RX ADMIN — LORAZEPAM 2 MG: 2 TABLET ORAL at 08:18

## 2023-07-21 RX ADMIN — LISINOPRIL 20 MG: 10 TABLET ORAL at 08:18

## 2023-07-21 RX ADMIN — Medication 2 TABLET: at 08:18

## 2023-07-21 RX ADMIN — ORPHENADRINE CITRATE 100 MG: 100 TABLET, EXTENDED RELEASE ORAL at 08:18

## 2023-07-21 RX ADMIN — TRAZODONE HYDROCHLORIDE 50 MG: 50 TABLET ORAL at 21:10

## 2023-07-21 RX ADMIN — LORAZEPAM 2 MG: 2 TABLET ORAL at 21:09

## 2023-07-21 RX ADMIN — ORPHENADRINE CITRATE 100 MG: 100 TABLET, EXTENDED RELEASE ORAL at 21:10

## 2023-07-21 RX ADMIN — LORAZEPAM 2 MG: 2 TABLET ORAL at 15:01

## 2023-07-21 NOTE — PLAN OF CARE
Goal Outcome Evaluation:        Problem: Adult Behavioral Health Plan of Care  Goal: Patient-Specific Goal (Individualization)  Outcome: Ongoing, Progressing  Flowsheets  Taken 7/21/2023 1411 by Aimee Padilla CSW  Patient-Specific Goals (Include Timeframe): Identify 2-3 coping skills, address relapse prevention methods, complete aftercare plans, and deny SI/HI prior to discharge.  Individualized Care Needs: Therapist to offer 1-4 therapy sessions, aftercare planning, safety planning, family education, group therapy, and brief CBT/MI interventions.  Taken 7/21/2023 1045 by Aimee Padilla CSW  Patient Personal Strengths:   resilient   resourceful   motivated for treatment   motivated for recovery   positive vocational history   positive educational history   family/social support   expressive of emotions   expressive of needs   independent living skills   intellectual cognitive skills   stable living environment   spiritual/Hoahaoism support   self-awareness  Patient Vulnerabilities:   occupational insecurity   history of unsuccessful treatment   substance abuse/addiction   poor impulse control   poor physical health   recent loss  Taken 7/20/2023 1555 by Sarahi Cook RN  Anxieties, Fears or Concerns: none reported  Goal: Optimized Coping Skills in Response to Life Stressors  Outcome: Ongoing, Progressing  Flowsheets (Taken 7/21/2023 1411)  Optimized Coping Skills in Response to Life Stressors: making progress toward outcome  Intervention: Promote Effective Coping Strategies  Flowsheets (Taken 7/21/2023 1411)  Supportive Measures:   active listening utilized   counseling provided   decision-making supported   goal-setting facilitated   verbalization of feelings encouraged   positive reinforcement provided   self-responsibility promoted   self-reflection promoted   self-care encouraged  Goal: Develops/Participates in Therapeutic [place] to Support Successful Transition  Outcome: Ongoing,  Progressing  Flowsheets (Taken 7/21/2023 1411)  Develops/Participates in Therapeutic [place] to Support Successful Transition: making progress toward outcome  Intervention: Foster Therapeutic [place]  Flowsheets (Taken 7/21/2023 0820 by Ruddy Gandhi RN)  Trust Relationship/Rapport:   care explained   choices provided   emotional support provided   empathic listening provided   questions answered   questions encouraged   reassurance provided   thoughts/feelings acknowledged  Intervention: Mutually Develop Transition Plan  Flowsheets  Taken 7/21/2023 1411  Outpatient/Agency/Support Group Needs: residential services  Transition Support:   follow-up care discussed   community resources reviewed   crisis management plan promoted   crisis management plan verbalized   follow-up care coordinated  Anticipated Discharge Disposition: home with family  Taken 7/21/2023 1410  Discharge Coordination/Progress: Patient has [employer] insurance. Therapist met with patient to complete assessment, patient agreeable.  Transportation Anticipated: family or friend will provide  Transportation Concerns: none  Current Discharge Risk: substance use/abuse  Concerns to be Addressed:   discharge planning   mental health   coping/stress   cognitive/perceptual   substance/tobacco abuse/use  Readmission Within the Last 30 Days: no previous admission in last 30 days  Patient/Family Anticipated Services at Transition:   outpatient care   mental health services  Patient's Choice of Community Agency(s): To be determined  Patient/Family Anticipates Transition to: home with family  Offered/Gave Vendor List: yes         DATA:         Therapist met individually with patient this date to introduce role and to discuss hospitalization expectations. Patient agreeable.      Clinical Maneuvering/Intervention:     Therapist assisted patient in processing above session content; acknowledged and normalized patient’s thoughts, feelings, and  concerns.  Discussed the therapist/patient relationship and explain the parameters and limitations of relative confidentiality.  Also discussed the importance of active participation, and honesty to the treatment process.  Encouraged the patient to discuss/vent their feelings, frustrations, and fears concerning their ongoing medical issues and validated their feelings.     Discussed the importance of finding enjoyable activities and coping skills that the patient can engage in a regular basis. Discussed healthy coping skills such as distraction, self love, grounding, thought challenges/reframing, etc.  Provided patient with list of healthy coping skills this date. Discussed the importance of medication compliance.  Praised the patient for seeking help and spent the majority of the session building rapport.       Allowed patient to freely discuss issues without interruption or judgment. Provided safe, confidential environment to facilitate the development of positive therapeutic relationship and encourage open, honest communication.      Therapist addressed discharge safety planning this date. Assisted patient in identifying risk factors which would indicate the need for higher level of care after discharge;  including thoughts to harm self or others and/or self-harming behavior. Encouraged patient to call 911, or present to the nearest emergency room should any of these events occur. Discussed crisis intervention services and means to access.  Encouraged securing any objects of harm.       Therapist completed integrated summary, treatment plan, and initiated social history this date.  Therapist is strongly encouraging family involvement in treatment.       ASSESSMENT:      The patient is a 42 y/o  male admitted for alcohol detox treatment. Therapist met with patient on this date to complete assessment. Patient reports moderate anxiety and mild depression, denies current SI/HI/AVH. Patient denies  experiencing any active withdrawal symptoms. Patient last admitted to [facility] on 10/13/22. 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 Patient began drinking at age 12, longest period of sobriety was 9 months. Patient discussed his physical pain being primary contributor to his drinking. Patient is requesting an outpatient neurology appointment be made prior to discharge so that he can get back surgery. Patient also inquired about how to file a complaint against the last outpatient PA he saw. Patient did not consent to family involvement in treatment at this time. Patient is not agreeable to behavioral health aftercare.      PLAN:       Patient to remain hospitalized this date.     Treatment team will focus efforts on stabilizing patient's acute symptoms while providing education on healthy coping and crisis management to reduce hospitalizations.   Patient requires daily psychiatrist evaluation and 24/7 nursing supervision to promote patient  safety.     Therapist will offer 1-4 individual sessions, 1 therapy group daily, family education, and appropriate referral.    Therapist recommends [facility] residential rehab.

## 2023-07-21 NOTE — H&P
INITIAL PSYCHIATRIC HISTORY & PHYSICAL    Patient Identification:  Name:   Favio Perez  Age:   43 y.o.  Sex:   male  :   1980  MRN:   [id number]  Visit Number:   [id number]  Primary Care Physician:   Provider, No Known    SUBJECTIVE    CC/Focus of Exam: Detox    HPI: Favio Perez is a 43 y.o. male who was admitted on 2023 with complaints of alcohol  use and withdrawals. The patient reports a long history of substance use. First use was 12 years old. Over time the use increased and the patient  continued to use despite negative consequences. The patient endorses symptoms of tolerance and withdrawals. Has tried to cut down and stop but has not been successful. Spends too much time and resources in pursuit of substance use. Longest period of sobriety is reported to be 8 months.  Currently using marijuana, 10- 16 ounce beers,  Last use 2023  Withdrawal symptoms patient denies any  Patient denies any substance abuse but states that he smokes marijuana.  Patient states that he uses tobacco.  Patient denies any history of seizures with withdrawal.  Patient states the death of his father made him relapse.  Patient states his pain as a stressor in his life.  Patient denies any history of physical, mental, or sexual abuse.  Patient rates his appetite is fair.  Patient rates his sleep is poor.  Patient states that he has nightmares.  Patient rates his anxiety on a scale of 1-10 with 10 being the most severe a 6.  Patient rates his depression on a scale of 1-10 with 10 being the most severe a 2.  Patient denies any cravings.  Patient's CIWA was 7.  Patient denies any suicidal ideation.  Patient denies any homicidal ideation.  Patient denies any hallucinations. Patient was admitted to [facility] psychiatry for further safety and stabilization.    Available medical/psychiatric records reviewed and incorporated into the current document.     PAST PSYCHIATRIC HX: Patient has had 4 prior  admissions with the most recent on 10--10-.  Patient denies any outpatient care.    SUBSTANCE USE HX: UDS was positive for THC.  See HPI for current use.    SOCIAL HX: Patient states that he was born in [facility].  Patient states that he was raised in [place], [place].  Patient states that he currently resides with his wife in [facility].  Patient states that he is  and has 1 daughter who stays at the [facility].  Patient states that he is currently unemployed.  Patient states that he has a high school diploma.  Patient denies any legal issues.    Past Medical History:   Diagnosis Date    Anxiety     Diverticulitis     ETOH abuse     HTN (hypertension)     Hypertension     Low back pain        Past Surgical History:   Procedure Laterality Date    COLON RESECTION N/A 11/10/2016    Procedure: COLON RESECTION LAPAROSCOPIC ;  Surgeon: Miky Antoine MD;  Location: [facility] MAIN OR;  Service:     COLONOSCOPY N/A 11/9/2016    Procedure: COLONOSCOPY;  Surgeon: Miky Antoine MD;  Location: [facility] ENDOSCOPY;  Service:     FRACTURE SURGERY         Family History   Problem Relation Age of Onset    Arthritis Mother     Hypertension Mother     Arthritis Father     Cancer Father     Heart disease Father     Hypertension Brother     Diabetes Maternal Grandmother     Heart disease Paternal Grandfather     Hypertension Paternal Grandfather          Medications Prior to Admission   Medication Sig Dispense Refill Last Dose    DULoxetine (CYMBALTA) 30 MG capsule Take 1 capsule by mouth Daily. Indications: Generalized Anxiety Disorder   7/19/2023    hydrOXYzine (ATARAX) 25 MG tablet Take 1 tablet by mouth 3 (Three) Times a Day As Needed for Anxiety.   7/19/2023    lisinopril-hydrochlorothiazide (PRINZIDE,ZESTORETIC) 20-12.5 MG per tablet Take 1 tablet by mouth Daily. Indications: High Blood Pressure Disorder   7/19/2023    vitamin D (ERGOCALCIFEROL) 1.25 MG (88366 UT)  capsule capsule Take 1 capsule by mouth 1 (One) Time Per Week.   Past Month    Diclofenac Sodium (VOLTAREN) 1 % gel gel Apply 4 g topically to the appropriate area as directed 2 (Two) Times a Day As Needed (left knee pain).   Unknown    ketorolac (TORADOL) 10 MG tablet Take 1 tablet by mouth Every 6 (Six) Hours As Needed for Moderate Pain for up to 5 days. 20 tablet 0 Unknown    orphenadrine (NORFLEX) 100 MG 12 hr tablet Take 1 tablet by mouth 2 (Two) Times a Day for 5 days. 10 tablet 0 Unknown           ALLERGIES:  Patient has no known allergies.    Temp:  [96.2 °F (35.7 °C)-97.6 °F (36.4 °C)] 97.6 °F (36.4 °C)  Heart Rate:  [59-81] 78  Resp:  [18] 18  BP: (119-153)/(77-99) 138/89    REVIEW OF SYSTEMS:  Review of Systems   Constitutional:  Positive for chills and fatigue.   HENT: Negative.     Eyes: Negative.    Respiratory: Negative.     Cardiovascular: Negative.    Gastrointestinal: Negative.    Endocrine: Negative.    Genitourinary: Negative.    Musculoskeletal:  Positive for back pain and myalgias.   Hematological: Negative.    Psychiatric/Behavioral:  Positive for dysphoric mood. The patient is nervous/anxious.     See HPI for psychiatric ROS  OBJECTIVE    PHYSICAL EXAM:  Physical Exam  Constitutional:  Appears well-developed and well-nourished.   HENT:   Head: Normocephalic and atraumatic.   Right Ear: External ear normal.   Left Ear: External ear normal.   Mouth/Throat: Oropharynx is clear and moist.   Eyes: Pupils are equal, round, and reactive to light. Conjunctivae and EOM are normal.   Neck: Normal range of motion. Neck supple.   Cardiovascular: Normal rate, regular rhythm and normal heart sounds.    Respiratory: Effort normal and breath sounds normal. No respiratory distress. No wheezes.   GI: Soft. Bowel sounds are normal.No distension. There is no tenderness.   Musculoskeletal: Normal range of motion. No edema or deformity.   Neurological:No cranial nerve deficit. Coordination normal.   Skin: Skin is  warm and dry. No rash noted. No erythema.     MENTAL STATUS EXAM:   Hygiene:   fair  Cooperation:  Cooperative  Eye Contact:  Good  Psychomotor Behavior:  Appropriate  Affect:  Appropriate  Hopelessness: 5  Speech:  Normal  Linear  Thought Content:  Normal  Suicidal:  None  Homicidal:  None  Hallucinations:  None  Delusion:  None  Memory:  Intact  Orientation:  Person, Place, Time, and Situation  Reliability:  fair  Insight:  Fair  Judgement:  Poor  Impulse Control:  Poor      Imaging Results (Last 24 Hours)       ** No results found for the last 24 hours. **             ECG/EMG Results (most recent)       Procedure Component Value Units Date/Time    ECG 12 Lead Other [[id number]] Collected: 07/20/23 1808     Updated: 07/21/23 1042     QT Interval 408 ms      QTC Interval 437 ms     Narrative:      Test Reason : Other~  Blood Pressure :   */*   mmHG  Vent. Rate :  69 BPM     Atrial Rate :  69 BPM     P-R Int : 176 ms          QRS Dur : 100 ms      QT Int : 408 ms       P-R-T Axes :  63  63  51 degrees     QTc Int : 437 ms    Normal sinus rhythm  Normal ECG  When compared with ECG of 14-OCT-2022 01:13,  No significant change was found  Confirmed by Bryce Webb ([id number]) on 7/21/2023 10:35:47 AM    Referred By: JULES           Confirmed By: Bryce Webb             Lab Results   Component Value Date    GLUCOSE 127 (H) 07/21/2023    BUN 12 07/21/2023    CREATININE 0.87 07/21/2023    EGFRIFNONA 106 05/21/2020    BCR 13.8 07/21/2023    CO2 22.6 07/21/2023    CALCIUM 9.8 07/21/2023    ALBUMIN 4.4 07/21/2023    LABIL2 1.3 (L) 07/30/2015    AST 17 07/21/2023    ALT 22 07/21/2023       Lab Results   Component Value Date    WBC 10.99 (H) 07/20/2023    HGB 14.4 07/20/2023    HCT 42.9 07/20/2023    MCV 92.5 07/20/2023     07/20/2023       Pain Management Panel  More data exists         Latest Ref Rng & Units 7/20/2023 10/13/2022   Pain Management Panel   Amphetamine, Urine Qual Negative Negative  Negative    Barbiturates  Screen, Urine Negative Negative  Negative    Benzodiazepine Screen, Urine Negative Negative  Negative    Buprenorphine, Screen, Urine Negative Negative  Negative    Cocaine Screen, Urine Negative Negative  Negative    Fentanyl, Urine Negative Negative  -   Methadone Screen , Urine Negative Negative  Negative    Methamphetamine, Ur Negative Negative  Negative        Brief Urine Lab Results  (Last result in the past 365 days)        Color   Clarity   Blood   Leuk Est   Nitrite   Protein   CREAT   Urine HCG        07/20/23 1217 Yellow   Clear   Negative   Negative   Negative   Negative                   DATA  Labs reviewed. Sodium 135, glucose 127. WBC 10.99. UDS positive for thc.    EKG reviewed. QTc 437 ms  BETO reviewed.   Record reviewed. The patient was last here in Oct 2022 for alcohol use disorder.           ASSESSMENT & PLAN:        Alcohol use disorder, severe, dependence    Alcohol withdrawals  -Ativan detox  -Thiamine and folate      HTN (hypertension)  -Lisinopril  -HCTZ      Tetrahydrocannabinol (THC) use disorder, moderate, dependence  -Supportive treatment      Nicotine use disorder  -Encouraged cessation. Discussed risk of nicotine use. Offered replacement but patient wants to continue taking it.       Back pain  -Orphenadrine      The patient has been admitted for safety and stabilization.  Patient will be monitored for suicidality daily and maintained on Special Precautions Level 4 (q30 min checks).  The patient will have individual and group therapy with a master's level therapist. A master treatment plan will be developed and agreed upon by the patient and his/her treatment team.  The patient's estimated length of stay in the hospital is 5-7 days.       Written by Shobha Peter acting as scribe for Dr.Mazhar Gregory signature on this note affirms that the note adequately documents the care provided.   This note was generated using a scribe,   Shobha Peter MA  07/21/23  10:56 AM EDT    I,  Cecelia Gregory MD, personally performed the services described in this documentation as scribed by the above named individual in my presence, and it is both accurate and complete.

## 2023-07-21 NOTE — PLAN OF CARE
Goal Outcome Evaluation:  Plan of Care Reviewed With: patient  Patient Agreement with Plan of Care: agrees     Progress: improving  Outcome Evaluation: Patient calm and cooperative. Rates anxiety 4/10. Denies depression, cravings, withdrawal symptoms, SI, HI, or Yi. No other issues noted at this time. Will continue to monitor.

## 2023-07-22 PROCEDURE — 99232 SBSQ HOSP IP/OBS MODERATE 35: CPT | Performed by: PSYCHIATRY & NEUROLOGY

## 2023-07-22 RX ADMIN — TRAZODONE HYDROCHLORIDE 50 MG: 50 TABLET ORAL at 21:08

## 2023-07-22 RX ADMIN — HYDROXYZINE HYDROCHLORIDE 50 MG: 50 TABLET ORAL at 21:08

## 2023-07-22 RX ADMIN — LISINOPRIL 20 MG: 10 TABLET ORAL at 08:05

## 2023-07-22 RX ADMIN — Medication 2 TABLET: at 08:05

## 2023-07-22 RX ADMIN — LORAZEPAM 1.5 MG: 0.5 TABLET ORAL at 21:08

## 2023-07-22 RX ADMIN — HYDROCHLOROTHIAZIDE 12.5 MG: 25 TABLET ORAL at 08:07

## 2023-07-22 RX ADMIN — Medication 100 MG: at 08:05

## 2023-07-22 RX ADMIN — ORPHENADRINE CITRATE 100 MG: 100 TABLET, EXTENDED RELEASE ORAL at 21:09

## 2023-07-22 RX ADMIN — LORAZEPAM 1.5 MG: 0.5 TABLET ORAL at 14:21

## 2023-07-22 RX ADMIN — Medication 1 TABLET: at 08:05

## 2023-07-22 RX ADMIN — HYDROXYZINE HYDROCHLORIDE 50 MG: 50 TABLET ORAL at 08:07

## 2023-07-22 RX ADMIN — ORPHENADRINE CITRATE 100 MG: 100 TABLET, EXTENDED RELEASE ORAL at 08:05

## 2023-07-22 RX ADMIN — DULOXETINE HYDROCHLORIDE 30 MG: 30 CAPSULE, DELAYED RELEASE ORAL at 08:05

## 2023-07-22 RX ADMIN — LORAZEPAM 1.5 MG: 0.5 TABLET ORAL at 08:05

## 2023-07-22 NOTE — PLAN OF CARE
Goal Outcome Evaluation:  Plan of Care Reviewed With: patient  Patient Agreement with Plan of Care: agrees     Progress: improving  Outcome Evaluation: Patient calm and cooperative. Rates anxiety 3/10. Denies depression, cravings, withdrawal symptoms, SI, HI, or Yi. No other issues noted at this time. Will continue to monitor.

## 2023-07-22 NOTE — PLAN OF CARE
"Goal Outcome Evaluation:  Plan of Care Reviewed With: patient  Patient Agreement with Plan of Care: agrees     Progress: improving  Outcome Evaluation: Pt cooperative with staff and peers. Pt rates anxiety 6/10, depression 4/10 and denies craving. When asked about w/d sx, pt states \"I'm bored.\" Pt denies SI/HI/AVH.    Pt had difficulty going to sleep earlier in the night, but once asleep he appeared to sleep throughout the night.     "

## 2023-07-22 NOTE — PROGRESS NOTES
"INPATIENT PSYCHIATRIC PROGRESS NOTE    Name:  Favio Perez  :  1980  MRN:  [id number]  Visit Number:  [id number]  Length of stay:  2    SUBJECTIVE    CC/Focus of Exam: Alcohol use disorder and withdrawal    INTERVAL HISTORY: Patient reports today that he is not having any major withdrawal symptoms.  He states that he had a short relapse and did not drink as heavily as he has in the past which is likely why he is doing so well.  He reports sleep and appetite are stable.  He denies SI/HI/AVH.    Depression rating 0/10  Anxiety rating 3/10  Sleep: sleeping well  Withdrawal sx: None  Cravin/10    Review of Systems   Constitutional: Negative.    HENT: Negative.     Eyes: Negative.    Respiratory: Negative.     Cardiovascular: Negative.    Gastrointestinal: Negative.      OBJECTIVE    Temp:  [97.1 °F (36.2 °C)-97.8 °F (36.6 °C)] 97.8 °F (36.6 °C)  Heart Rate:  [76-84] 82  Resp:  [16-18] 16  BP: (117-148)/(69-98) 117/69    MENTAL STATUS EXAM:  Appearance: Casually dressed, good hygeine.   Cooperation: Cooperative  Psychomotor: No psychomotor agitation/retardation, No EPS, No motor tics  Speech: normal rate, amount.  Mood: \"Fine\"   Affect: congruent, appropriate  Thought Content: goal directed, no delusional material present  Thought process: linear, organized.  Suicidality: No SI  Homicidality: No HI  Perception: No AH/VH  Insight: fair   Judgment: fair    Lab Results (last 24 hours)       ** No results found for the last 24 hours. **               Imaging Results (Last 24 Hours)       ** No results found for the last 24 hours. **               ECG/EMG Results (most recent)       Procedure Component Value Units Date/Time    ECG 12 Lead Other [[id number]] Collected: 23 180     Updated: 23 1042     QT Interval 408 ms      QTC Interval 437 ms     Narrative:      Test Reason : Other~  Blood Pressure :   */*   mmHG  Vent. Rate :  69 BPM     Atrial Rate :  69 BPM     P-R Int : 176 ms          QRS " Dur : 100 ms      QT Int : 408 ms       P-R-T Axes :  63  63  51 degrees     QTc Int : 437 ms    Normal sinus rhythm  Normal ECG  When compared with ECG of 14-OCT-2022 01:13,  No significant change was found  Confirmed by Bryce Webb ([id number]) on 7/21/2023 10:35:47 AM    Referred By: JULES           Confirmed By: Bryce Webb             ALLERGIES: Patient has no known allergies.      Current Facility-Administered Medications:     acetaminophen (TYLENOL) tablet 650 mg, 650 mg, Oral, Q6H PRN, Cecelia Gregory MD    aluminum-magnesium hydroxide-simethicone (MAALOX MAX) 400-400-40 MG/5ML suspension 15 mL, 15 mL, Oral, Q6H PRN, Cecelia Gregory MD    B-complex with vitamin C tablet 2 tablet, 2 tablet, Oral, Daily, Cecelia Gregory MD, 2 tablet at 07/22/23 0805    benzonatate (TESSALON) capsule 100 mg, 100 mg, Oral, TID PRN, Cecelia Gregory MD    benztropine (COGENTIN) tablet 2 mg, 2 mg, Oral, Once PRN **OR** benztropine (COGENTIN) injection 1 mg, 1 mg, Intramuscular, Once PRN, Cecelia Gregory MD    cholecalciferol (VITAMIN D3) capsule 50,000 Units, 50,000 Units, Oral, Weekly, Cecelia Gregory MD, 50,000 Units at 07/20/23 2106    Diclofenac Sodium (VOLTAREN) 1 % gel 4 g, 4 g, Topical, BID PRN, Cecelia Gregory MD    DULoxetine (CYMBALTA) DR capsule 30 mg, 30 mg, Oral, Daily, Cecelia Gregory MD, 30 mg at 07/22/23 0805    famotidine (PEPCID) tablet 20 mg, 20 mg, Oral, BID PRN, Cecelia Gregory MD    lisinopril (PRINIVIL,ZESTRIL) tablet 20 mg, 20 mg, Oral, Q24H, 20 mg at 07/22/23 0805 **AND** hydroCHLOROthiazide (HYDRODIURIL) tablet 12.5 mg, 12.5 mg, Oral, Q24H, Cecelia Gregory MD, 12.5 mg at 07/22/23 0807    hydrOXYzine (ATARAX) tablet 50 mg, 50 mg, Oral, Q6H PRN, Cecelia Gregory MD, 50 mg at 07/22/23 0807    ibuprofen (ADVIL,MOTRIN) tablet 400 mg, 400 mg, Oral, Q6H PRN, Cecelia Gregory MD, 400 mg at 07/21/23 2109    loperamide (IMODIUM) capsule 2 mg, 2 mg, Oral, Q2H PRN, Cecelia Gregory MD    [COMPLETED] LORazepam (ATIVAN) tablet 2 mg, 2 mg,  Oral, 3 times per day, 2 mg at 23 **FOLLOWED BY** LORazepam (ATIVAN) tablet 1.5 mg, 1.5 mg, Oral, 3 times per day, 1.5 mg at 23 1421 **FOLLOWED BY** [START ON 2023] LORazepam (ATIVAN) tablet 1 mg, 1 mg, Oral, 3 times per day **FOLLOWED BY** [START ON 2023] LORazepam (ATIVAN) tablet 0.5 mg, 0.5 mg, Oral, 3 times per day, Cecelia Gregory MD    [] LORazepam (ATIVAN) tablet 2 mg, 2 mg, Oral, Q4H PRN **FOLLOWED BY** LORazepam (ATIVAN) tablet 1.5 mg, 1.5 mg, Oral, Q4H PRN **FOLLOWED BY** [START ON 2023] LORazepam (ATIVAN) tablet 1 mg, 1 mg, Oral, Q4H PRN **FOLLOWED BY** [START ON 2023] LORazepam (ATIVAN) tablet 0.5 mg, 0.5 mg, Oral, Q4H PRN, Cecelia Gregory MD    magnesium hydroxide (MILK OF MAGNESIA) suspension 10 mL, 10 mL, Oral, Daily PRN, Cecelia Gregory MD    multivitamin with minerals 1 tablet, 1 tablet, Oral, Daily, Cecelia Gregory MD, 1 tablet at 23 0805    ondansetron (ZOFRAN) tablet 4 mg, 4 mg, Oral, Q6H PRN, Cecelia Gregory MD    orphenadrine (NORFLEX) 12 hr tablet 100 mg, 100 mg, Oral, BID, Cecelia Gregory MD, 100 mg at 23 0805    sodium chloride nasal spray 2 spray, 2 spray, Each Nare, PRN, Cecelia Gregory MD    thiamine (VITAMIN B-1) tablet 100 mg, 100 mg, Oral, Daily, Cecelia Gregory MD, 100 mg at 23 0805    traZODone (DESYREL) tablet 50 mg, 50 mg, Oral, Nightly PRN, Cecelia Gregory MD, 50 mg at 23    Reviewed chart, notes, vitals, labs and EKG personally reviewed.    ASSESSMENT & PLAN:     Alcohol use disorder, severe, dependence    Alcohol withdrawals  -Ativan detox  -Thiamine and folate       HTN (hypertension)  -Lisinopril  -HCTZ       Tetrahydrocannabinol (THC) use disorder, moderate, dependence  -Supportive treatment       Nicotine use disorder  -Encouraged cessation. Discussed risk of nicotine use. Offered replacement but patient wants to continue taking it.        Back pain  -Orphenadrine       Special precautions: Special Precautions  Level 4 (q30 min checks)          Behavioral Health Treatment Plan and Problem List: I have reviewed and approved the Behavioral Health Treatment Plan and Problem list.  The patient has had a chance to review and agrees with the treatment plan.     Clinician:  Conner Gandhi MD  07/22/23  15:24 EDT

## 2023-07-23 VITALS
OXYGEN SATURATION: 97 % | TEMPERATURE: 97.6 F | HEART RATE: 63 BPM | WEIGHT: 224.6 LBS | RESPIRATION RATE: 18 BRPM | SYSTOLIC BLOOD PRESSURE: 145 MMHG | HEIGHT: 71 IN | BODY MASS INDEX: 31.44 KG/M2 | DIASTOLIC BLOOD PRESSURE: 100 MMHG

## 2023-07-23 PROCEDURE — 99239 HOSP IP/OBS DSCHRG MGMT >30: CPT | Performed by: PSYCHIATRY & NEUROLOGY

## 2023-07-23 RX ORDER — HYDROXYZINE 50 MG/1
50 TABLET, FILM COATED ORAL EVERY 6 HOURS PRN
Qty: 90 TABLET | Refills: 0 | Status: SHIPPED | OUTPATIENT
Start: 2023-07-23

## 2023-07-23 RX ADMIN — HYDROCHLOROTHIAZIDE 12.5 MG: 25 TABLET ORAL at 08:22

## 2023-07-23 RX ADMIN — Medication 100 MG: at 08:22

## 2023-07-23 RX ADMIN — LISINOPRIL 20 MG: 10 TABLET ORAL at 08:22

## 2023-07-23 RX ADMIN — ORPHENADRINE CITRATE 100 MG: 100 TABLET, EXTENDED RELEASE ORAL at 08:22

## 2023-07-23 RX ADMIN — Medication 2 TABLET: at 08:22

## 2023-07-23 RX ADMIN — LORAZEPAM 1 MG: 1 TABLET ORAL at 08:22

## 2023-07-23 RX ADMIN — Medication 1 TABLET: at 08:22

## 2023-07-23 RX ADMIN — DULOXETINE HYDROCHLORIDE 30 MG: 30 CAPSULE, DELAYED RELEASE ORAL at 08:22

## 2023-07-23 NOTE — PLAN OF CARE
Goal Outcome Evaluation:  Plan of Care Reviewed With: patient  Patient Agreement with Plan of Care: agrees     Progress: improving  Outcome Evaluation: Pt denies all w/d sx, SI/HI/AVH and craving. Pt calm and cooperative, and rates anxiety 6/10 and depression 2/10.

## 2023-07-23 NOTE — DISCHARGE SUMMARY
PSYCHIATRIC DISCHARGE SUMMARY     Patient Identification:  Name:  Favio Perez  Age:  43 y.o.  Sex:  male  :  1980  MRN:  [id number]  Visit Number:  [id number]      Date of Admission:2023   Date of Discharge:  2023    Discharge Diagnosis:  Alcohol use disorder, severe, dependence  Hypertension  Cannabis use disorder  Nicotine use disorder        Admission Diagnosis:  alcohol Addiction     Hospital Course  Patient is a 43 y.o. male presented with complaints of alcohol use with withdrawal.      Patient was admitted for crisis stabilization and voluntary detox.  Patient was evaluated by psychiatrist on a daily basis and had the opportunity for both group and individual therapy by master's level therapist.  Routine laboratory studies and EKG were performed.    Patient was placed on Ativan detox protocol with CIWA for withdrawal checks and supplemented with thiamine and multivitamin for alcohol use disorder.  Home lisinopril and HCTZ were continued for hypertension.  Supportive care was offered for cannabis use disorder.  Patient was encouraged to cease use of cannabis and alcohol and encouraged to consider substance abuse treatment including inpatient rehab at discharge but he was not amenable to going to a rehab facility.  Smoking cessation was encouraged but patient was not ready to stop smoking or utilizing nicotine at this time.  Home medication for chronic back pain was continued.  Patient only exhibited withdrawal symptoms on the day of admission and past that had no symptoms noted for 2 days.  He was requesting to leave to facilitate transport home today and given the time course of his symptoms and relapse as well as his overall clinical picture, patient was found appropriate for discharge.  He had a short relapse to a lesser severity and over less time than he has had in the past, about 1 month of significant drinking and 6 months with daily drinking overall.  He adamantly and  "convincingly denied SI/HI/AVH.    Mental Status Exam upon discharge:   Mood \"Fine\"   Affect-congruent, appropriate, stable  Thought Content-goal directed, no delusional material present  Thought process-linear, organized.  Suicidality: No SI  Homicidality: No HI  Perception: No AH/VH  Insight and Judgement: fair    Procedures Performed         Consults:   Consults       No orders found from 6/21/2023 to 7/21/2023.            Pertinent Test Results:  Lab Results (last 72 hours)       Procedure Component Value Units Date/Time    Comprehensive Metabolic Panel [[id number]]  (Abnormal) Collected: 07/21/23 0521    Specimen: Blood Updated: 07/21/23 0614     Glucose 127 mg/dL      BUN 12 mg/dL      Creatinine 0.87 mg/dL      Sodium 135 mmol/L      Potassium 4.5 mmol/L      Chloride 101 mmol/L      CO2 22.6 mmol/L      Calcium 9.8 mg/dL      Total Protein 6.9 g/dL      Albumin 4.4 g/dL      ALT (SGPT) 22 U/L      AST (SGOT) 17 U/L      Alkaline Phosphatase 58 U/L      Total Bilirubin 0.9 mg/dL      Globulin 2.5 gm/dL      A/G Ratio 1.8 g/dL      BUN/Creatinine Ratio 13.8     Anion Gap 11.4 mmol/L      eGFR 108.4 mL/min/1.73     Narrative:      GFR Normal >60  Chronic Kidney Disease <60  Kidney Failure <15                ECG/EMG Results (most recent)       Procedure Component Value Units Date/Time    ECG 12 Lead Other [[id number]] Collected: 07/20/23 1808     Updated: 07/21/23 1042     QT Interval 408 ms      QTC Interval 437 ms     Narrative:      Test Reason : Other~  Blood Pressure :   */*   mmHG  Vent. Rate :  69 BPM     Atrial Rate :  69 BPM     P-R Int : 176 ms          QRS Dur : 100 ms      QT Int : 408 ms       P-R-T Axes :  63  63  51 degrees     QTc Int : 437 ms    Normal sinus rhythm  Normal ECG  When compared with ECG of 14-OCT-2022 01:13,  No significant change was found  Confirmed by Bryce Webb ([id number]) on 7/21/2023 10:35:47 AM    Referred By: JULES           Confirmed By: Bryce Webb             EKG: normal " EKG, normal sinus rhythm, unchanged from previous tracings.    Condition on Discharge:  stable    Vital Signs  Temp:  [97.6 °F (36.4 °C)-97.7 °F (36.5 °C)] 97.6 °F (36.4 °C)  Heart Rate:  [63-81] 63  Resp:  [18] 18  BP: (125-145)/() 145/100    Discharge Disposition:  Home or Self Care    Discharge Medications:     Discharge Medications        Changes to Medications        Instructions Start Date   hydrOXYzine 50 MG tablet  Commonly known as: ATARAX  What changed:   medication strength  how much to take  when to take this   50 mg, Oral, Every 6 Hours PRN             Continue These Medications        Instructions Start Date   Diclofenac Sodium 1 % gel gel  Commonly known as: VOLTAREN   4 g, Topical, 2 Times Daily PRN      DULoxetine 30 MG capsule  Commonly known as: CYMBALTA   30 mg, Oral, Daily      lisinopril-hydrochlorothiazide 20-12.5 MG per tablet  Commonly known as: PRINZIDE,ZESTORETIC   1 tablet, Oral, Daily      orphenadrine 100 MG 12 hr tablet  Commonly known as: NORFLEX   100 mg, Oral, 2 Times Daily      vitamin D 1.25 MG (46138 UT) capsule capsule  Commonly known as: ERGOCALCIFEROL   50,000 Units, Oral, Weekly             Stop These Medications      ketorolac 10 MG tablet  Commonly known as: TORADOL              Discharge Diet:   Diet Instructions    Advance as tolerated        Regular    Activity at Discharge:   Activity Instructions    As tolerated        As tolerated    Follow-up Appointments  No future appointments.      Test Results Pending at Discharge   None    I spent a total of 36 minutes face-to-face with the patient regarding discharge planning, evaluation, discussion, and follow-up plans.        Clinician:   Conner Gandhi MD  07/23/23  16:09 EDT